# Patient Record
Sex: FEMALE | Race: WHITE | NOT HISPANIC OR LATINO | Employment: UNEMPLOYED | URBAN - METROPOLITAN AREA
[De-identification: names, ages, dates, MRNs, and addresses within clinical notes are randomized per-mention and may not be internally consistent; named-entity substitution may affect disease eponyms.]

---

## 2017-07-10 ENCOUNTER — APPOINTMENT (OUTPATIENT)
Dept: GENERAL RADIOLOGY | Facility: HOSPITAL | Age: 37
End: 2017-07-10

## 2017-07-10 ENCOUNTER — HOSPITAL ENCOUNTER (EMERGENCY)
Facility: HOSPITAL | Age: 37
Discharge: HOME | End: 2017-07-10
Attending: EMERGENCY MEDICINE | Admitting: EMERGENCY MEDICINE

## 2017-07-10 VITALS
RESPIRATION RATE: 14 BRPM | HEIGHT: 67 IN | DIASTOLIC BLOOD PRESSURE: 84 MMHG | WEIGHT: 165 LBS | BODY MASS INDEX: 25.9 KG/M2 | TEMPERATURE: 98 F | HEART RATE: 105 BPM | OXYGEN SATURATION: 98 % | SYSTOLIC BLOOD PRESSURE: 127 MMHG

## 2017-07-10 DIAGNOSIS — R00.2 PALPITATIONS: Primary | ICD-10-CM

## 2017-07-10 LAB
ALBUMIN SERPL-MCNC: 4.8 G/DL (ref 3.5–5.2)
ALBUMIN/GLOB SERPL: 1.7 G/DL
ALP SERPL-CCNC: 66 U/L (ref 39–117)
ALT SERPL W P-5'-P-CCNC: 79 U/L (ref 1–33)
ANION GAP SERPL CALCULATED.3IONS-SCNC: 26.3 MMOL/L
AST SERPL-CCNC: 79 U/L (ref 1–32)
BASOPHILS # BLD AUTO: 0.04 10*3/MM3 (ref 0–0.2)
BASOPHILS NFR BLD AUTO: 0.5 % (ref 0–1.5)
BILIRUB SERPL-MCNC: 0.6 MG/DL (ref 0.1–1.2)
BUN BLD-MCNC: 11 MG/DL (ref 6–20)
BUN/CREAT SERPL: 13.6 (ref 7–25)
CALCIUM SPEC-SCNC: 9.8 MG/DL (ref 8.6–10.5)
CHLORIDE SERPL-SCNC: 93 MMOL/L (ref 98–107)
CO2 SERPL-SCNC: 17.7 MMOL/L (ref 22–29)
CREAT BLD-MCNC: 0.81 MG/DL (ref 0.57–1)
D DIMER PPP FEU-MCNC: 0.34 MCGFEU/ML (ref 0–0.49)
DEPRECATED RDW RBC AUTO: 46 FL (ref 37–54)
EOSINOPHIL # BLD AUTO: 0.01 10*3/MM3 (ref 0–0.7)
EOSINOPHIL NFR BLD AUTO: 0.1 % (ref 0.3–6.2)
ERYTHROCYTE [DISTWIDTH] IN BLOOD BY AUTOMATED COUNT: 14.2 % (ref 11.7–13)
GFR SERPL CREATININE-BSD FRML MDRD: 80 ML/MIN/1.73
GLOBULIN UR ELPH-MCNC: 2.9 GM/DL
GLUCOSE BLD-MCNC: 68 MG/DL (ref 65–99)
HCG SERPL QL: NEGATIVE
HCT VFR BLD AUTO: 37.8 % (ref 35.6–45.5)
HGB BLD-MCNC: 13.4 G/DL (ref 11.9–15.5)
HOLD SPECIMEN: NORMAL
HOLD SPECIMEN: NORMAL
IMM GRANULOCYTES # BLD: 0.02 10*3/MM3 (ref 0–0.03)
IMM GRANULOCYTES NFR BLD: 0.2 % (ref 0–0.5)
LYMPHOCYTES # BLD AUTO: 1.31 10*3/MM3 (ref 0.9–4.8)
LYMPHOCYTES NFR BLD AUTO: 16.1 % (ref 19.6–45.3)
MCH RBC QN AUTO: 31.5 PG (ref 26.9–32)
MCHC RBC AUTO-ENTMCNC: 35.4 G/DL (ref 32.4–36.3)
MCV RBC AUTO: 88.9 FL (ref 80.5–98.2)
MONOCYTES # BLD AUTO: 0.45 10*3/MM3 (ref 0.2–1.2)
MONOCYTES NFR BLD AUTO: 5.5 % (ref 5–12)
NEUTROPHILS # BLD AUTO: 6.29 10*3/MM3 (ref 1.9–8.1)
NEUTROPHILS NFR BLD AUTO: 77.6 % (ref 42.7–76)
PLATELET # BLD AUTO: 250 10*3/MM3 (ref 140–500)
PMV BLD AUTO: 9.2 FL (ref 6–12)
POTASSIUM BLD-SCNC: 4.4 MMOL/L (ref 3.5–5.2)
PROT SERPL-MCNC: 7.7 G/DL (ref 6–8.5)
RBC # BLD AUTO: 4.25 10*6/MM3 (ref 3.9–5.2)
SODIUM BLD-SCNC: 137 MMOL/L (ref 136–145)
TROPONIN T SERPL-MCNC: <0.01 NG/ML (ref 0–0.03)
TSH SERPL DL<=0.05 MIU/L-ACNC: 2.2 MIU/ML (ref 0.27–4.2)
WBC NRBC COR # BLD: 8.12 10*3/MM3 (ref 4.5–10.7)
WHOLE BLOOD HOLD SPECIMEN: NORMAL
WHOLE BLOOD HOLD SPECIMEN: NORMAL

## 2017-07-10 PROCEDURE — 96360 HYDRATION IV INFUSION INIT: CPT

## 2017-07-10 PROCEDURE — 80053 COMPREHEN METABOLIC PANEL: CPT | Performed by: EMERGENCY MEDICINE

## 2017-07-10 PROCEDURE — 99283 EMERGENCY DEPT VISIT LOW MDM: CPT

## 2017-07-10 PROCEDURE — 85025 COMPLETE CBC W/AUTO DIFF WBC: CPT | Performed by: EMERGENCY MEDICINE

## 2017-07-10 PROCEDURE — 85379 FIBRIN DEGRADATION QUANT: CPT | Performed by: PHYSICIAN ASSISTANT

## 2017-07-10 PROCEDURE — 93010 ELECTROCARDIOGRAM REPORT: CPT | Performed by: INTERNAL MEDICINE

## 2017-07-10 PROCEDURE — 93005 ELECTROCARDIOGRAM TRACING: CPT | Performed by: EMERGENCY MEDICINE

## 2017-07-10 PROCEDURE — 71020 HC CHEST PA AND LATERAL: CPT

## 2017-07-10 PROCEDURE — 84443 ASSAY THYROID STIM HORMONE: CPT | Performed by: PHYSICIAN ASSISTANT

## 2017-07-10 PROCEDURE — 84484 ASSAY OF TROPONIN QUANT: CPT | Performed by: EMERGENCY MEDICINE

## 2017-07-10 PROCEDURE — 84703 CHORIONIC GONADOTROPIN ASSAY: CPT | Performed by: EMERGENCY MEDICINE

## 2017-07-10 RX ORDER — ASPIRIN 325 MG
325 TABLET ORAL ONCE
Status: DISCONTINUED | OUTPATIENT
Start: 2017-07-10 | End: 2017-07-10 | Stop reason: HOSPADM

## 2017-07-10 RX ORDER — SODIUM CHLORIDE 0.9 % (FLUSH) 0.9 %
10 SYRINGE (ML) INJECTION AS NEEDED
Status: DISCONTINUED | OUTPATIENT
Start: 2017-07-10 | End: 2017-07-10 | Stop reason: HOSPADM

## 2017-07-10 RX ADMIN — SODIUM CHLORIDE 1000 ML: 9 INJECTION, SOLUTION INTRAVENOUS at 14:14

## 2017-07-10 NOTE — DISCHARGE INSTRUCTIONS
Avoid caffeine containing drinks    Slow down alcohol drinking    Return for any worsening symptoms

## 2017-07-10 NOTE — ED PROVIDER NOTES
EMERGENCY DEPARTMENT ENCOUNTER    CHIEF COMPLAINT  Chief Complaint: palpitations  History given by: patient  History limited by: nothing  Room Number: 02/02  PMD: Kalpesh Fair MD      HPI:  Pt is a 36 y.o. female who presents complaining of palpitations onset PTA. Pt states she was sitting down when she began to feel the palpitations which caused her to panic and experience diaphoresis. Pt denies having N/V, CP, SOA, vaginal bleeding, weakness, or dizziness. Pt denies having caffiene today. Pt states she has hx of anxiety which she took medication for but has stopped.  Pt did have a half a can of a [employer] Drink today.  Pt does drink alcohol regularly.      Duration:  PTA  Onset: sudden  Timing: constant  Quality: unspecified  Intensity/Severity: moderate  Progression: improving  Associated Symptoms: diaphoresis, panic  Aggravating Factors: anxiety  Alleviating Factors: unknown  Previous Episodes: Pt has hx of anxiety and has come to the ED previously for similar episodes.   Treatment before arrival: none.    PAST MEDICAL HISTORY  Active Ambulatory Problems     Diagnosis Date Noted   • No Active Ambulatory Problems     Resolved Ambulatory Problems     Diagnosis Date Noted   • No Resolved Ambulatory Problems     No Additional Past Medical History       PAST SURGICAL HISTORY  History reviewed. No pertinent surgical history.    FAMILY HISTORY  History reviewed. No pertinent family history.    SOCIAL HISTORY  Social History     Social History   • Marital status: Single     Spouse name: N/A   • Number of children: N/A   • Years of education: N/A     Occupational History   • Not on file.     Social History Main Topics   • Smoking status: Current Every Day Smoker   • Smokeless tobacco: Not on file   • Alcohol use No   • Drug use: No   • Sexual activity: Not on file     Other Topics Concern   • Not on file     Social History Narrative   • No narrative on file       ALLERGIES  Penicillins    REVIEW OF  SYSTEMS  Review of Systems   Constitutional: Positive for diaphoresis. Negative for fever.   HENT: Negative for sore throat.    Eyes: Negative.    Respiratory: Negative for cough and shortness of breath.    Cardiovascular: Negative for chest pain.   Gastrointestinal: Negative for abdominal pain, diarrhea, nausea and vomiting.   Genitourinary: Negative for dysuria and vaginal bleeding.   Musculoskeletal: Negative for neck pain.   Skin: Negative for rash.   Allergic/Immunologic: Negative.    Neurological: Negative for dizziness, weakness, numbness and headaches.   Hematological: Negative.    Psychiatric/Behavioral: The patient is nervous/anxious.    All other systems reviewed and are negative.      PHYSICAL EXAM  ED Triage Vitals   Temp Heart Rate Resp BP SpO2   07/10/17 1203 07/10/17 1203 07/10/17 1203 07/10/17 1203 07/10/17 1203   98 °F (36.7 °C) 106 16 140/81 100 %      Temp src Heart Rate Source Patient Position BP Location FiO2 (%)   -- -- -- -- --              Physical Exam   Constitutional: She is oriented to person, place, and time and well-developed, well-nourished, and in no distress. No distress.   HENT:   Head: Normocephalic and atraumatic.   Eyes: EOM are normal. Pupils are equal, round, and reactive to light.   Neck: Normal range of motion. Neck supple.   Cardiovascular: Regular rhythm and normal heart sounds.  Tachycardia present.    Pulmonary/Chest: Effort normal and breath sounds normal. No respiratory distress.   Abdominal: Soft. There is no tenderness. There is no rebound and no guarding.   Musculoskeletal: Normal range of motion. She exhibits no edema.   Neurological: She is alert and oriented to person, place, and time. She has normal sensation and normal strength.   Skin: Skin is warm and dry. No rash noted.   Psychiatric: Mood and affect normal.   Nursing note and vitals reviewed.      LAB RESULTS  Lab Results (last 24 hours)     Procedure Component Value Units Date/Time    CBC & Differential  [[id number]] Collected:  07/10/17 1236    Specimen:  Blood Updated:  07/10/17 1251    Narrative:       The following orders were created for panel order CBC & Differential.  Procedure                               Abnormality         Status                     ---------                               -----------         ------                     CBC Auto Differential[[id number]]         Abnormal            Final result                 Please view results for these tests on the individual orders.    Comprehensive Metabolic Panel [[id number]]  (Abnormal) Collected:  07/10/17 1236    Specimen:  Blood Updated:  07/10/17 1309     Glucose 68 mg/dL      BUN 11 mg/dL      Creatinine 0.81 mg/dL      Sodium 137 mmol/L      Potassium 4.4 mmol/L      Chloride 93 (L) mmol/L      CO2 17.7 (L) mmol/L      Calcium 9.8 mg/dL      Total Protein 7.7 g/dL      Albumin 4.80 g/dL      ALT (SGPT) 79 (H) U/L      AST (SGOT) 79 (H) U/L      Alkaline Phosphatase 66 U/L      Total Bilirubin 0.6 mg/dL      eGFR Non African Amer 80 mL/min/1.73      Globulin 2.9 gm/dL      A/G Ratio 1.7 g/dL      BUN/Creatinine Ratio 13.6     Anion Gap 26.3 mmol/L     Troponin [[id number]]  (Normal) Collected:  07/10/17 1236    Specimen:  Blood Updated:  07/10/17 1315     Troponin T <0.010 ng/mL     Narrative:       Troponin T Reference Ranges:  Less than 0.03 ng/mL:    Negative for AMI  0.03 to 0.09 ng/mL:      Indeterminant for AMI  Greater than 0.09 ng/mL: Positive for AMI    hCG, Serum, Qualitative [[id number]]  (Normal) Collected:  07/10/17 1236    Specimen:  Blood Updated:  07/10/17 1308     HCG Qualitative Negative    CBC Auto Differential [[id number]]  (Abnormal) Collected:  07/10/17 1236    Specimen:  Blood Updated:  07/10/17 1251     WBC 8.12 10*3/mm3      RBC 4.25 10*6/mm3      Hemoglobin 13.4 g/dL      Hematocrit 37.8 %      MCV 88.9 fL      MCH 31.5 pg      MCHC 35.4 g/dL      RDW 14.2 (H) %      RDW-SD 46.0 fl      MPV 9.2 fL      Platelets 250 10*3/mm3       Neutrophil % 77.6 (H) %      Lymphocyte % 16.1 (L) %      Monocyte % 5.5 %      Eosinophil % 0.1 (L) %      Basophil % 0.5 %      Immature Grans % 0.2 %      Neutrophils, Absolute 6.29 10*3/mm3      Lymphocytes, Absolute 1.31 10*3/mm3      Monocytes, Absolute 0.45 10*3/mm3      Eosinophils, Absolute 0.01 10*3/mm3      Basophils, Absolute 0.04 10*3/mm3      Immature Grans, Absolute 0.02 10*3/mm3     TSH [[id number]]  (Normal) Collected:  07/10/17 1236    Specimen:  Blood Updated:  07/10/17 1315     TSH 2.200 mIU/mL     D-dimer, Quantitative [[id number]]  (Normal) Collected:  07/10/17 1236    Specimen:  Blood Updated:  07/10/17 1302     D-Dimer, Quantitative 0.34 MCGFEU/mL     Narrative:       The Stago D-Dimer test used in conjunction with a clinical pretest probability (PTP) assessment model, has been approved by the FDA to rule out the presence of venous thromboembolism (VTE) in outpatients suspected of deep venous thrombosis (DVT) or pulmonary embolism (PE).           I ordered the above labs and reviewed the results    RADIOLOGY  XR Chest 2 View   Final Result       Negative for any acute abnormality.      I ordered the above noted radiological studies. Interpreted by radiologist. Reviewed by me in PACS.       PROCEDURES  Procedures      PROGRESS AND CONSULTS  ED Course   1220- Ordered EKG, labs, amd CXR for further evaluation. Ordered asprin for pain and IVF for hydration.   1355- Ordered IVF for hydration.  1357- Per Dr. Jeffrey, pt was drinking heavily this weekend and maybe experiencing withdrawal sx  1400- BP- 140/81 HR- 106 Temp- 98 °F (36.7 °C) O2 sat- 100%  Rechecked the patient who is in NAD and is resting comfortably. Notified pt of lab and imaging results including negative Chest CT. Informed pt to cut back on caffeine and alcohol intake. Discussed w/ pt and family plan to discharge. Pt and family are in agreement and all questions and concerns have been addressed at this time.     MEDICAL DECISION  MAKING  Results were reviewed/discussed with the patient and they were also made aware of online access. Pt also made aware that some labs, such as cultures, will not be resulted during ER visit and follow up with PMD is necessary.     MDM  Number of Diagnoses or Management Options  Palpitations:   Diagnosis management comments: No evidence of PE or cardiac ischemia.         Amount and/or Complexity of Data Reviewed  Decide to obtain previous medical records or to obtain history from someone other than the patient: yes  Review and summarize past medical records: yes (Pt was seen in the ED 1/22/16 for palpitations. )  Independent visualization of images, tracings, or specimens: yes           DIAGNOSIS  Final diagnoses:   Palpitations       DISPOSITION  DISCHARGE    Patient discharged in stable condition.    Reviewed implications of results, diagnosis, meds, responsibility to follow up, warning signs and symptoms of possible worsening, potential complications and reasons to return to ER.    Patient/Family voiced understanding of above instructions.    Discussed plan for discharge, as there is no emergent indication for admission.  Pt/family is agreeable and understands need for follow up and repeat testing.  Pt is aware that discharge does not mean that nothing is wrong but it indicates no emergency is present that requires admission and they must continue care with follow-up as given below or physician of their choice.     FOLLOW-UP  Kalpesh Fair MD  [address] [address]  [phone]    In 3 days  for recheck of symptoms         Medication List      Notice     No changes were made to your prescriptions during this visit.            Latest Documented Vital Signs:  As of 2:47 PM  BP- 127/84 HR- 105 Temp- 98 °F (36.7 °C) O2 sat- 98%    --  Documentation assistance provided by devan Gunn for Randy Nsah PA-C.  Information recorded by the scribmaxim was done at my direction and has been  verified and validated by me.     Mack Gunn  07/10/17 1453       SHELTON Villegas  07/10/17 1073

## 2017-07-10 NOTE — ED PROVIDER NOTES
"I supervised care provided by the midlevel provider.    We have discussed this patient's history, physical exam, and treatment plan.   I have reviewed the note and personally saw and examined the patient and agree with the plan of care.      Pt presents to the ED c/o palpitations described as \"rapid\" onset earlier today while pt was sitting at work. Pt reports that she drinks energy drinks routinely and last drank one earlier today. Pt denies dyspnea, CP, abd pain, and N/V/D. Pt reports that she had a similar episodes of palpitations last year after drinking excessive amounts of caffeinated beverages; pt reports that her sx resolved after pt stopped drinking the caffeinated beverages. Pt reports that drinks at last 0.5 pint ETOH daily. On physical exam, pt is tachycardic. Lungs are CTAB. Pt appears mildly anxious.    Will hold caffeine.  Given fluids for mild AKA.   Follow up with Dr. Fair.      EKG:           EKG time: 12:35 PM  Rhythm/Rate: Sinus tachycardia rate 155  P waves and WI: Normal P waves  QRS, axis: Normal QRS   ST and T waves: Normal ST     Interpreted Contemporaneously by me, independently viewed  Unchanged compared to prior 01/22/16          PROGRESS NOTES:    1:36 PM:  Discussed case with Dr. Fair, PMD.               Documentation assistance provided by Neal Kennedy. Information recorded by the scribe was done at my direction and has been verified and validated by me.     Entered by Neal Kennedy, acting as scribe for Dr. Rachele MD.        Neal Kennedy  07/10/17 8381       Nathan Jeffrey MD  07/10/17 3890    "

## 2017-07-10 NOTE — ED NOTES
Heart palpitations that began while sitting at work. EMS report initial pulse rate of 140. Pt reports feeling better now, pulse rate 106.      Sumaya Ward RN  07/10/17 120

## 2017-07-11 ENCOUNTER — OFFICE VISIT (OUTPATIENT)
Dept: INTERNAL MEDICINE | Facility: CLINIC | Age: 37
End: 2017-07-11

## 2017-07-11 ENCOUNTER — TELEPHONE (OUTPATIENT)
Dept: SOCIAL WORK | Facility: HOSPITAL | Age: 37
End: 2017-07-11

## 2017-07-11 VITALS
TEMPERATURE: 98.6 F | HEIGHT: 67 IN | SYSTOLIC BLOOD PRESSURE: 126 MMHG | BODY MASS INDEX: 24.67 KG/M2 | RESPIRATION RATE: 16 BRPM | OXYGEN SATURATION: 96 % | WEIGHT: 157.2 LBS | HEART RATE: 94 BPM | DIASTOLIC BLOOD PRESSURE: 72 MMHG

## 2017-07-11 DIAGNOSIS — F10.10 ALCOHOL ABUSE: ICD-10-CM

## 2017-07-11 DIAGNOSIS — Z72.0 TOBACCO ABUSE: Primary | ICD-10-CM

## 2017-07-11 PROCEDURE — 99213 OFFICE O/P EST LOW 20 MIN: CPT | Performed by: INTERNAL MEDICINE

## 2017-07-11 RX ORDER — LORATADINE 10 MG/1
10 TABLET ORAL DAILY
COMMUNITY

## 2017-07-11 NOTE — PROGRESS NOTES
"Swathi Kyle is a 36 y.o. female.     Chief Complaint   Patient presents with   • Rapid Heart Rate     \"150\" YESTERDAY, BETTER TODAY         History of Present Illness     The following portions of the patient's history were reviewed and updated as appropriate: allergies, current medications, past social history and problem list.    Outpatient Prescriptions Marked as Taking for the 7/11/17 encounter (Office Visit) with Kalpesh Fair MD   Medication Sig Dispense Refill   • loratadine (ALAVERT) 10 MG tablet Take 10 mg by mouth Daily.         Review of Systems    Objective   Vitals:    07/11/17 1406   BP: 126/72   Pulse: 94   Resp: 16   Temp: 98.6 °F (37 °C)   SpO2: 96%      Last Weight    07/11/17  1406   Weight: 157 lb 3.2 oz (71.3 kg)    [unfilled]  Body mass index is 24.62 kg/(m^2).      Physical Exam      Problem List Items Addressed This Visit     None        Assessment/Plan            Aurelio disclaimer:   Much of this encounter note is an electronic transcription/translation of spoken language to printed text. The electronic translation of spoken language may permit erroneous, or at times, nonsensical words or phrases to be inadvertently transcribed; Although I have reviewed the note for such errors, some may still exist.   "

## 2017-07-11 NOTE — PROGRESS NOTES
"Swathi Kyle is a 36 y.o. female.     Chief Complaint   Patient presents with   • Rapid Heart Rate     \"150\" YESTERDAY, BETTER TODAY         HPI Comments: Was in the emergency room yesterday with palpitations and a panic attack.  Sinus tachycardia about 120.  She had a moderate anion gap acidosis.  Actually began working with to the emergency room by ambulance.  Things calm down in the emergency room.  I discussed the situation with the emergency room physician.       The following portions of the patient's history were reviewed and updated as appropriate: allergies, current medications, past social history and problem list.    Outpatient Prescriptions Marked as Taking for the 7/11/17 encounter (Office Visit) with Kalpesh Fair MD   Medication Sig Dispense Refill   • loratadine (ALAVERT) 10 MG tablet Take 10 mg by mouth Daily.         Review of Systems   Constitutional: Negative for chills and fever.   Respiratory: Positive for shortness of breath. Negative for wheezing.    Cardiovascular: Positive for palpitations. Negative for chest pain and leg swelling.   Gastrointestinal: Negative for abdominal distention, abdominal pain and diarrhea.       Objective   Vitals:    07/11/17 1406   BP: 126/72   Pulse: 94   Resp: 16   Temp: 98.6 °F (37 °C)   SpO2: 96%      Last Weight    07/11/17  1406   Weight: 157 lb 3.2 oz (71.3 kg)    [unfilled]  Body mass index is 24.62 kg/(m^2).      Physical Exam   Constitutional: She appears well-developed and well-nourished.   Cardiovascular: Normal rate, regular rhythm, normal heart sounds and intact distal pulses.  Exam reveals no friction rub.    No murmur heard.  Pulmonary/Chest: Effort normal and breath sounds normal. No respiratory distress. She has no wheezes. She has no rales.         Problem List Items Addressed This Visit        Other    Alcohol abuse      Other Visit Diagnoses     Tobacco abuse    -  Primary    Relevant Medications    varenicline (CHANTIX " STARTING MONTH JOSE) 0.5 MG X 11 & 1 MG X 42 tablet        Assessment/Plan   In the emergency room yesterday with a panic attack.  She's been pretty much overstimulated between tobacco and caffeine and Sudafed.  Also drinking too much alcohol.  Significant anion gap acidosis.  She's much better today.  We discussed the tobacco use and she is wishing to start on Chantix today.  We discussed something for anxiety but will hold off until she is well situated on the Chantix.  Likely start Zoloft at a later date.  We discussed staying away from pseudoephedrine.  Complete alcohol abstinence.  Avoid energy drinks and Sudafed.  I'm going to recheck her in a month and see how she is doing and hopefully start on some Zoloft at that time.  Mother did very well in the past on Chantix which is a good point in favor of Chantix being a good choice.           Dragon disclaimer:   Much of this encounter note is an electronic transcription/translation of spoken language to printed text. The electronic translation of spoken language may permit erroneous, or at times, nonsensical words or phrases to be inadvertently transcribed; Although I have reviewed the note for such errors, some may still exist.

## 2017-08-07 RX ORDER — VARENICLINE TARTRATE 1 MG/1
1 TABLET, FILM COATED ORAL 2 TIMES DAILY
Qty: 60 TABLET | Refills: 0 | Status: SHIPPED | OUTPATIENT
Start: 2017-08-07 | End: 2017-08-08 | Stop reason: SDUPTHER

## 2017-08-08 RX ORDER — VARENICLINE TARTRATE 1 MG/1
1 TABLET, FILM COATED ORAL 2 TIMES DAILY
Qty: 60 TABLET | Refills: 0 | Status: SHIPPED | OUTPATIENT
Start: 2017-08-08 | End: 2017-09-05 | Stop reason: SDUPTHER

## 2017-08-14 ENCOUNTER — OFFICE VISIT (OUTPATIENT)
Dept: INTERNAL MEDICINE | Facility: CLINIC | Age: 37
End: 2017-08-14

## 2017-08-14 VITALS
SYSTOLIC BLOOD PRESSURE: 132 MMHG | BODY MASS INDEX: 27.06 KG/M2 | RESPIRATION RATE: 16 BRPM | HEART RATE: 87 BPM | WEIGHT: 172.4 LBS | OXYGEN SATURATION: 98 % | TEMPERATURE: 98.5 F | HEIGHT: 67 IN | DIASTOLIC BLOOD PRESSURE: 76 MMHG

## 2017-08-14 DIAGNOSIS — F41.9 ANXIETY: Primary | ICD-10-CM

## 2017-08-14 PROCEDURE — 99213 OFFICE O/P EST LOW 20 MIN: CPT | Performed by: INTERNAL MEDICINE

## 2017-08-14 NOTE — PROGRESS NOTES
Subjective   Emma Kyle is a 37 y.o. female.     Chief Complaint   Patient presents with   • Anxiety         Anxiety   Presents for follow-up visit. Symptoms include nervous/anxious behavior. Patient reports no chest pain, depressed mood, nausea, palpitations, panic or shortness of breath. Symptoms occur occasionally. The severity of symptoms is mild. The quality of sleep is good. Nighttime awakenings: none.            The following portions of the patient's history were reviewed and updated as appropriate: allergies, current medications, past social history and problem list.    Outpatient Prescriptions Marked as Taking for the 8/14/17 encounter (Office Visit) with Kalpesh Fair MD   Medication Sig Dispense Refill   • loratadine (ALAVERT) 10 MG tablet Take 10 mg by mouth Daily.     • varenicline (CHANTIX CONTINUING MONTH JOSE) 1 MG tablet Take 1 tablet by mouth 2 (Two) Times a Day. 60 tablet 0       Review of Systems   Constitutional: Negative for chills and fever.   Respiratory: Negative for shortness of breath.    Cardiovascular: Negative for chest pain and palpitations.   Gastrointestinal: Negative for constipation, diarrhea, nausea and vomiting.   Psychiatric/Behavioral: The patient is nervous/anxious.        Objective   Vitals:    08/14/17 1505   BP: 132/76   Pulse: 87   Resp: 16   Temp: 98.5 °F (36.9 °C)   SpO2: 98%      Last Weight    08/14/17  1505   Weight: 172 lb 6.4 oz (78.2 kg)    [unfilled]  Body mass index is 27 kg/(m^2).      Physical Exam   Constitutional: She appears well-developed and well-nourished.   Psychiatric: She has a normal mood and affect. Her behavior is normal. Judgment and thought content normal.         Problem List Items Addressed This Visit     None      Visit Diagnoses     Anxiety    -  Primary        Assessment/Plan   She is in for recheck on anxiety.  She's on Chantix for tobacco cessation.  She is down to about 2 cigarettes per day.  Had been 1 pack per day.  She's held  off on taking Zoloft would not take it at the same time as his Chantix.  Has not seemed to of needed it recently.  She is working very hard with long hours and that tires her out pretty good.  She rarely drinks alcohol now.  Advised to try to keep that complete abstinence.  I'll check again in 3 months since he'll return with the Chantix and see if it's time to try her on some Zoloft.  He is also off of caffeine and energy drinks.  Spent 15 this with patient today, over 50% counseling her on her anxiety and substances.  She is also due for TD AP and Pneumovax today.  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         Dragon disclaimer:   Much of this encounter note is an electronic transcription/translation of spoken language to printed text. The electronic translation of spoken language may permit erroneous, or at times, nonsensical words or phrases to be inadvertently transcribed; Although I have reviewed the note for such errors, some may still exist.

## 2017-09-05 RX ORDER — VARENICLINE TARTRATE 1 MG/1
1 TABLET, FILM COATED ORAL 2 TIMES DAILY
Qty: 60 TABLET | Refills: 0 | Status: SHIPPED | OUTPATIENT
Start: 2017-09-05 | End: 2017-10-31

## 2017-10-31 ENCOUNTER — OFFICE VISIT (OUTPATIENT)
Dept: INTERNAL MEDICINE | Facility: CLINIC | Age: 37
End: 2017-10-31

## 2017-10-31 VITALS
HEIGHT: 67 IN | DIASTOLIC BLOOD PRESSURE: 62 MMHG | OXYGEN SATURATION: 94 % | RESPIRATION RATE: 16 BRPM | HEART RATE: 100 BPM | WEIGHT: 182 LBS | BODY MASS INDEX: 28.56 KG/M2 | TEMPERATURE: 98.9 F | SYSTOLIC BLOOD PRESSURE: 130 MMHG

## 2017-10-31 DIAGNOSIS — F41.9 ANXIETY: ICD-10-CM

## 2017-10-31 DIAGNOSIS — M25.572 ACUTE LEFT ANKLE PAIN: Primary | ICD-10-CM

## 2017-10-31 PROCEDURE — 99213 OFFICE O/P EST LOW 20 MIN: CPT | Performed by: INTERNAL MEDICINE

## 2017-10-31 RX ORDER — PROPRANOLOL HYDROCHLORIDE 40 MG/1
40 TABLET ORAL 3 TIMES DAILY
Qty: 30 TABLET | Refills: 2 | Status: SHIPPED | OUTPATIENT
Start: 2017-10-31 | End: 2018-02-22 | Stop reason: SDUPTHER

## 2017-10-31 NOTE — PROGRESS NOTES
Subjective   Emma Kyle is a 37 y.o. female.     Chief Complaint   Patient presents with   • Anxiety         Anxiety   Presents for follow-up visit. Symptoms include nervous/anxious behavior. Patient reports no chest pain, depressed mood, nausea, palpitations, panic or shortness of breath. Symptoms occur occasionally. The severity of symptoms is mild. The quality of sleep is good. Nighttime awakenings: none.            The following portions of the patient's history were reviewed and updated as appropriate: allergies, current medications, past social history and problem list.    Outpatient Prescriptions Marked as Taking for the 10/31/17 encounter (Office Visit) with Kalpesh Fair MD   Medication Sig Dispense Refill   • loratadine (ALAVERT) 10 MG tablet Take 10 mg by mouth Daily.         Review of Systems   Constitutional: Negative for chills and fever.   Respiratory: Negative for shortness of breath.    Cardiovascular: Negative for chest pain and palpitations.   Gastrointestinal: Negative for constipation, diarrhea, nausea and vomiting.   Musculoskeletal: Positive for arthralgias (left ankle).   Psychiatric/Behavioral: The patient is nervous/anxious.        Objective   Vitals:    10/31/17 1525   BP: 130/62   Pulse: 100   Resp: 16   Temp: 98.9 °F (37.2 °C)   SpO2: 94%      Last Weight    10/31/17  1525   Weight: 182 lb (82.6 kg)    [unfilled]  Body mass index is 28.51 kg/(m^2).      Physical Exam   Constitutional: She appears well-developed and well-nourished.   Psychiatric: She has a normal mood and affect. Her behavior is normal. Judgment and thought content normal.         Problem List Items Addressed This Visit     None      Visit Diagnoses     Acute left ankle pain    -  Primary    Anxiety            Assessment/Plan   She is in for recheck on anxiety.  She's been off of tobacco for 2 months and off of Chantix for same.  At time.  Doing very well with her tobacco cessation.   She is also having pain in  left ankle.  Symptoms for 6-12 months.  His been particularly intense in the last 2 weeks.  Wearing a brace.  No specific injury.  This is along the inferior groove at the medial malleolus.  Pain is only with weightbearing.  With torsion or palpation.  We'll need to defer this to foot orthopedist.  She has been taking 1 Aleve a day.  Needs to take 2 twice a day.  She still has periodic and intermittent anxiety.  Generally at work.  We'll try on some Inderal when necessary.  Spent 15 this with patient today, over 50% counseling her on her anxiety and substances.  Recheck anxiety in 4 months.         Dragon disclaimer:   Much of this encounter note is an electronic transcription/translation of spoken language to printed text. The electronic translation of spoken language may permit erroneous, or at times, nonsensical words or phrases to be inadvertently transcribed; Although I have reviewed the note for such errors, some may still exist.

## 2018-02-22 ENCOUNTER — OFFICE VISIT (OUTPATIENT)
Dept: INTERNAL MEDICINE | Facility: CLINIC | Age: 38
End: 2018-02-22

## 2018-02-22 VITALS
SYSTOLIC BLOOD PRESSURE: 132 MMHG | OXYGEN SATURATION: 98 % | HEART RATE: 89 BPM | WEIGHT: 193.2 LBS | BODY MASS INDEX: 30.32 KG/M2 | TEMPERATURE: 98.3 F | DIASTOLIC BLOOD PRESSURE: 82 MMHG | RESPIRATION RATE: 16 BRPM | HEIGHT: 67 IN

## 2018-02-22 DIAGNOSIS — F41.9 ANXIETY: Primary | ICD-10-CM

## 2018-02-22 PROCEDURE — 99213 OFFICE O/P EST LOW 20 MIN: CPT | Performed by: INTERNAL MEDICINE

## 2018-02-22 RX ORDER — PROPRANOLOL HYDROCHLORIDE 40 MG/1
40 TABLET ORAL 3 TIMES DAILY PRN
Qty: 30 TABLET | Refills: 2 | Status: SHIPPED | OUTPATIENT
Start: 2018-02-22 | End: 2018-10-19 | Stop reason: SDUPTHER

## 2018-02-22 NOTE — PROGRESS NOTES
Subjective   Emma Kyle is a 37 y.o. female.     Chief Complaint   Patient presents with   • Anxiety         Anxiety   Presents for follow-up visit. Symptoms include nervous/anxious behavior. Patient reports no chest pain, depressed mood, nausea, palpitations, panic or shortness of breath. Symptoms occur occasionally. The severity of symptoms is mild. The quality of sleep is good. Nighttime awakenings: none.            The following portions of the patient's history were reviewed and updated as appropriate: allergies, current medications, past social history and problem list.    Outpatient Prescriptions Marked as Taking for the 2/22/18 encounter (Office Visit) with Kalpesh Fair MD   Medication Sig Dispense Refill   • propranolol (INDERAL) 40 MG tablet Take 1 tablet by mouth 3 (Three) Times a Day. 30 tablet 2       Review of Systems   Constitutional: Negative for chills and fever.   Respiratory: Negative for shortness of breath.    Cardiovascular: Negative for chest pain and palpitations.   Gastrointestinal: Negative for constipation, diarrhea, nausea and vomiting.   Musculoskeletal: Positive for arthralgias (left ankle).   Psychiatric/Behavioral: The patient is nervous/anxious.        Objective   Vitals:    02/22/18 1434   BP: 132/82   Pulse: 89   Resp: 16   Temp: 98.3 °F (36.8 °C)   SpO2: 98%      Last Weight    02/22/18  1434   Weight: 87.6 kg (193 lb 3.2 oz)    [unfilled]  Body mass index is 30.25 kg/(m^2).      Physical Exam   Constitutional: She appears well-developed and well-nourished.   Psychiatric: She has a normal mood and affect. Her behavior is normal. Judgment and thought content normal.         Problem List Items Addressed This Visit        Other    Anxiety - Primary        Assessment/Plan   She is in for recheck on anxiety.  She's been off of tobacco for 5 months and off of Chantix.  Doing very well with her tobacco cessation.   She is also having pain in left ankle.  Minimal now. She still  has periodic and intermittent anxiety.  Generally at work.  Inderal when necessary great for her.  She has a rare drink of alcohol when she's out in social settings.  We discussed trying to avoid that.  Spent 15 this with patient today, over 50% counseling her on her anxiety and substances.  Weight is up 50 pounds with tobacco cessation.  She's going to a weight loss program and started phentermine.  Recheck anxiety in 4 months.         Dragon disclaimer:   Much of this encounter note is an electronic transcription/translation of spoken language to printed text. The electronic translation of spoken language may permit erroneous, or at times, nonsensical words or phrases to be inadvertently transcribed; Although I have reviewed the note for such errors, some may still exist.

## 2018-06-15 ENCOUNTER — OFFICE VISIT (OUTPATIENT)
Dept: INTERNAL MEDICINE | Facility: CLINIC | Age: 38
End: 2018-06-15

## 2018-06-15 VITALS
BODY MASS INDEX: 25.49 KG/M2 | WEIGHT: 162.4 LBS | HEART RATE: 117 BPM | TEMPERATURE: 98.9 F | SYSTOLIC BLOOD PRESSURE: 116 MMHG | OXYGEN SATURATION: 97 % | HEIGHT: 67 IN | RESPIRATION RATE: 16 BRPM | DIASTOLIC BLOOD PRESSURE: 86 MMHG

## 2018-06-15 DIAGNOSIS — Z00.00 ENCOUNTER FOR PREVENTIVE HEALTH EXAMINATION: Primary | ICD-10-CM

## 2018-06-15 DIAGNOSIS — F41.9 ANXIETY: ICD-10-CM

## 2018-06-15 PROCEDURE — 99395 PREV VISIT EST AGE 18-39: CPT | Performed by: INTERNAL MEDICINE

## 2018-06-15 RX ORDER — PHENTERMINE HYDROCHLORIDE 37.5 MG/1
37.5 CAPSULE ORAL EVERY MORNING
COMMUNITY
End: 2018-10-19

## 2018-06-15 RX ORDER — TRIAMTERENE AND HYDROCHLOROTHIAZIDE 75; 50 MG/1; MG/1
0.5 TABLET ORAL DAILY
COMMUNITY
End: 2018-10-19 | Stop reason: SDUPTHER

## 2018-06-15 NOTE — PATIENT INSTRUCTIONS
BMI for Adults  Body mass index (BMI) is a number that is calculated from a person's weight and height. In most adults, the number is used to find how much of an adult's weight is made up of fat. BMI is not as accurate as a direct measure of body fat.  How is BMI calculated?  BMI is calculated by dividing weight in kilograms by height in meters squared. It can also be calculated by dividing weight in pounds by height in inches squared, then multiplying the resulting number by 703. Charts are available to help you find your BMI quickly and easily without doing this calculation.  How is BMI interpreted?  Health care professionals use BMI charts to identify whether an adult is underweight, at a normal weight, or overweight based on the following guidelines:  · Underweight: BMI less than 18.5.  · Normal weight: BMI between 18.5 and 24.9.  · Overweight: BMI between 25 and 29.9.  · Obese: BMI of 30 and above.    BMI is usually interpreted the same for males and females.  Weight includes both fat and muscle, so someone with a muscular build, such as an athlete, may have a BMI that is higher than 24.9. In cases like these, BMI may not accurately depict body fat. To determine if excess body fat is the cause of a BMI of 25 or higher, further assessments may need to be done by a health care provider.  Why is BMI a useful tool?  BMI is used to identify a possible weight problem that may be related to a medical problem or may increase the risk for medical problems. BMI can also be used to promote changes to reach a healthy weight.  This information is not intended to replace advice given to you by your health care provider. Make sure you discuss any questions you have with your health care provider.  Document Released: 08/29/2005 Document Revised: 04/27/2017 Document Reviewed: 05/15/2015  [employer] Interactive Patient Education © 2018 [employer]  Exercising to Lose Weight  Exercising can help you to lose weight. In order to  lose weight through exercise, you need to do vigorous-intensity exercise. You can tell that you are exercising with vigorous intensity if you are breathing very hard and fast and cannot hold a conversation while exercising.  Moderate-intensity exercise helps to maintain your current weight. You can tell that you are exercising at a moderate level if you have a higher heart rate and faster breathing, but you are still able to hold a conversation.  How often should I exercise?  Choose an activity that you enjoy and set realistic goals. Your health care provider can help you to make an activity plan that works for you. Exercise regularly as directed by your health care provider. This may include:  · Doing resistance training twice each week, such as:  ? Push-ups.  ? Sit-ups.  ? Lifting weights.  ? Using resistance bands.  · Doing a given intensity of exercise for a given amount of time. Choose from these options:  ? 150 minutes of moderate-intensity exercise every week.  ? 75 minutes of vigorous-intensity exercise every week.  ? A mix of moderate-intensity and vigorous-intensity exercise every week.    Children, pregnant women, people who are out of shape, people who are overweight, and older adults may need to consult a health care provider for individual recommendations. If you have any sort of medical condition, be sure to consult your health care provider before starting a new exercise program.  What are some activities that can help me to lose weight?  · Walking at a rate of at least 4.5 miles an hour.  · Jogging or running at a rate of 5 miles per hour.  · Biking at a rate of at least 10 miles per hour.  · Lap swimming.  · Roller-skating or in-line skating.  · Cross-country skiing.  · Vigorous competitive sports, such as football, basketball, and soccer.  · Jumping rope.  · Aerobic dancing.  How can I be more active in my day-to-day activities?  · Use the stairs instead of the elevator.  · Take a walk during your  lunch break.  · If you drive, park your car farther away from work or school.  · If you take public transportation, get off one stop early and walk the rest of the way.  · Make all of your phone calls while standing up and walking around.  · Get up, stretch, and walk around every 30 minutes throughout the day.  What guidelines should I follow while exercising?  · Do not exercise so much that you hurt yourself, feel dizzy, or get very short of breath.  · Consult your health care provider prior to starting a new exercise program.  · Wear comfortable clothes and shoes with good support.  · Drink plenty of water while you exercise to prevent dehydration or heat stroke. Body water is lost during exercise and must be replaced.  · Work out until you breathe faster and your heart beats faster.  This information is not intended to replace advice given to you by your health care provider. Make sure you discuss any questions you have with your health care provider.  Document Released: 01/20/2012 Document Revised: 05/25/2017 Document Reviewed: 05/21/2015  [employer] Interactive Patient Education © 2018 [employer]  Calorie Counting for Weight Loss  Calories are units of energy. Your body needs a certain amount of calories from food to keep you going throughout the day. When you eat more calories than your body needs, your body stores the extra calories as fat. When you eat fewer calories than your body needs, your body burns fat to get the energy it needs.  Calorie counting means keeping track of how many calories you eat and drink each day. Calorie counting can be helpful if you need to lose weight. If you make sure to eat fewer calories than your body needs, you should lose weight. Ask your health care provider what a healthy weight is for you.  For calorie counting to work, you will need to eat the right number of calories in a day in order to lose a healthy amount of weight per week. A dietitian can help you determine how  many calories you need in a day and will give you suggestions on how to reach your calorie goal.  · A healthy amount of weight to lose per week is usually 1-2 lb (0.5-0.9 kg). This usually means that your daily calorie intake should be reduced by 500-750 calories.  · Eating 1,200 - 1,500 calories per day can help most women lose weight.  · Eating 1,500 - 1,800 calories per day can help most men lose weight.    What is my plan?  My goal is to have __________ calories per day.  If I have this many calories per day, I should lose around __________ pounds per week.  What do I need to know about calorie counting?  In order to meet your daily calorie goal, you will need to:  · Find out how many calories are in each food you would like to eat. Try to do this before you eat.  · Decide how much of the food you plan to eat.  · Write down what you ate and how many calories it had. Doing this is called keeping a food log.    To successfully lose weight, it is important to balance calorie counting with a healthy lifestyle that includes regular activity. Aim for 150 minutes of moderate exercise (such as walking) or 75 minutes of vigorous exercise (such as running) each week.  Where do I find calorie information?    The number of calories in a food can be found on a Nutrition Facts label. If a food does not have a Nutrition Facts label, try to look up the calories online or ask your dietitian for help.  Remember that calories are listed per serving. If you choose to have more than one serving of a food, you will have to multiply the calories per serving by the amount of servings you plan to eat. For example, the label on a package of bread might say that a serving size is 1 slice and that there are 90 calories in a serving. If you eat 1 slice, you will have eaten 90 calories. If you eat 2 slices, you will have eaten 180 calories.  How do I keep a food log?  Immediately after each meal, record the following information in your food  "log:  · What you ate. Don't forget to include toppings, sauces, and other extras on the food.  · How much you ate. This can be measured in cups, ounces, or number of items.  · How many calories each food and drink had.  · The total number of calories in the meal.    Keep your food log near you, such as in a small notebook in your pocket, or use a mobile zeus or website. Some programs will calculate calories for you and show you how many calories you have left for the day to meet your goal.  What are some calorie counting tips?  · Use your calories on foods and drinks that will fill you up and not leave you hungry:  ? Some examples of foods that fill you up are nuts and nut butters, vegetables, lean proteins, and high-fiber foods like whole grains. High-fiber foods are foods with more than 5 g fiber per serving.  ? Drinks such as sodas, specialty coffee drinks, alcohol, and juices have a lot of calories, yet do not fill you up.  · Eat nutritious foods and avoid empty calories. Empty calories are calories you get from foods or beverages that do not have many vitamins or protein, such as candy, sweets, and soda. It is better to have a nutritious high-calorie food (such as an avocado) than a food with few nutrients (such as a bag of chips).  · Know how many calories are in the foods you eat most often. This will help you calculate calorie counts faster.  · Pay attention to calories in drinks. Low-calorie drinks include water and unsweetened drinks.  · Pay attention to nutrition labels for \"low fat\" or \"fat free\" foods. These foods sometimes have the same amount of calories or more calories than the full fat versions. They also often have added sugar, starch, or salt, to make up for flavor that was removed with the fat.  · Find a way of tracking calories that works for you. Get creative. Try different apps or programs if writing down calories does not work for you.  What are some portion control tips?  · Know how many " calories are in a serving. This will help you know how many servings of a certain food you can have.  · Use a measuring cup to measure serving sizes. You could also try weighing out portions on a kitchen scale. With time, you will be able to estimate serving sizes for some foods.  · Take some time to put servings of different foods on your favorite plates, bowls, and cups so you know what a serving looks like.  · Try not to eat straight from a bag or box. Doing this can lead to overeating. Put the amount you would like to eat in a cup or on a plate to make sure you are eating the right portion.  · Use smaller plates, glasses, and bowls to prevent overeating.  · Try not to multitask (for example, watch TV or use your computer) while eating. If it is time to eat, sit down at a table and enjoy your food. This will help you to know when you are full. It will also help you to be aware of what you are eating and how much you are eating.  What are tips for following this plan?  Reading food labels  · Check the calorie count compared to the serving size. The serving size may be smaller than what you are used to eating.  · Check the source of the calories. Make sure the food you are eating is high in vitamins and protein and low in saturated and trans fats.  Shopping  · Read nutrition labels while you shop. This will help you make healthy decisions before you decide to purchase your food.  · Make a grocery list and stick to it.  Cooking  · Try to cook your favorite foods in a healthier way. For example, try baking instead of frying.  · Use low-fat dairy products.  Meal planning  · Use more fruits and vegetables. Half of your plate should be fruits and vegetables.  · Include lean proteins like poultry and fish.  How do I count calories when eating out?  · Ask for smaller portion sizes.  · Consider sharing an entree and sides instead of getting your own entree.  · If you get your own entree, eat only half. Ask for a box at the  beginning of your meal and put the rest of your entree in it so you are not tempted to eat it.  · If calories are listed on the menu, choose the lower calorie options.  · Choose dishes that include vegetables, fruits, whole grains, low-fat dairy products, and lean protein.  · Choose items that are boiled, broiled, grilled, or steamed. Stay away from items that are buttered, battered, fried, or served with cream sauce. Items labeled “crispy” are usually fried, unless stated otherwise.  · Choose water, low-fat milk, unsweetened iced tea, or other drinks without added sugar. If you want an alcoholic beverage, choose a lower calorie option such as a glass of wine or light beer.  · Ask for dressings, sauces, and syrups on the side. These are usually high in calories, so you should limit the amount you eat.  · If you want a salad, choose a garden salad and ask for grilled meats. Avoid extra toppings like zuleta, cheese, or fried items. Ask for the dressing on the side, or ask for olive oil and vinegar or lemon to use as dressing.  · Estimate how many servings of a food you are given. For example, a serving of cooked rice is ½ cup or about the size of half a baseball. Knowing serving sizes will help you be aware of how much food you are eating at restaurants. The list below tells you how big or small some common portion sizes are based on everyday objects:  ? 1 oz--4 stacked dice.  ? 3 oz--1 deck of cards.  ? 1 tsp--1 die.  ? 1 Tbsp--½ a ping-pong ball.  ? 2 Tbsp--1 ping-pong ball.  ? ½ cup--½ baseball.  ? 1 cup--1 baseball.  Summary  · Calorie counting means keeping track of how many calories you eat and drink each day. If you eat fewer calories than your body needs, you should lose weight.  · A healthy amount of weight to lose per week is usually 1-2 lb (0.5-0.9 kg). This usually means reducing your daily calorie intake by 500-750 calories.  · The number of calories in a food can be found on a Nutrition Facts label. If a  food does not have a Nutrition Facts label, try to look up the calories online or ask your dietitian for help.  · Use your calories on foods and drinks that will fill you up, and not on foods and drinks that will leave you hungry.  · Use smaller plates, glasses, and bowls to prevent overeating.  This information is not intended to replace advice given to you by your health care provider. Make sure you discuss any questions you have with your health care provider.  Document Released: 2006 Document Revised: 2017 Document Reviewed: 2017  [employer] Interactive Patient Education © 2018 [employer]  Tdap Vaccine (Tetanus, Diphtheria and Pertussis): What You Need to Know  1. Why get vaccinated?  Tetanus, diphtheria and pertussis are very serious diseases. Tdap vaccine can protect us from these diseases. And, Tdap vaccine given to pregnant women can protect  babies against pertussis.  TETANUS (Lockjaw) is rare in the [place] today. It causes painful muscle tightening and stiffness, usually all over the body.  · It can lead to tightening of muscles in the head and neck so you can't open your mouth, swallow, or sometimes even breathe. Tetanus kills about 1 out of 10 people who are infected even after receiving the best medical care.    DIPHTHERIA is also rare in the [place] today. It can cause a thick coating to form in the back of the throat.  · It can lead to breathing problems, heart failure, paralysis, and death.    PERTUSSIS (Whooping Cough) causes severe coughing spells, which can cause difficulty breathing, vomiting and disturbed sleep.  · It can also lead to weight loss, incontinence, and rib fractures. Up to 2 in 100 adolescents and 5 in 100 adults with pertussis are hospitalized or have complications, which could include pneumonia or death.    These diseases are caused by bacteria. Diphtheria and pertussis are spread from person to person through secretions from coughing or  sneezing. Tetanus enters the body through cuts, scratches, or wounds.  Before vaccines, as many as 200,000 cases of diphtheria, 200,000 cases of pertussis, and hundreds of cases of tetanus, were reported in the [place] each year. Since vaccination began, reports of cases for tetanus and diphtheria have dropped by about 99% and for pertussis by about 80%.  2. Tdap vaccine  Tdap vaccine can protect adolescents and adults from tetanus, diphtheria, and pertussis. One dose of Tdap is routinely given at age 11 or 12. People who did not get Tdap at that age should get it as soon as possible.  Tdap is especially important for healthcare professionals and anyone having close contact with a baby younger than 12 months.  Pregnant women should get a dose of Tdap during every pregnancy, to protect the  from pertussis. Infants are most at risk for severe, life-threatening complications from pertussis.  Another vaccine, called Td, protects against tetanus and diphtheria, but not pertussis. A Td booster should be given every 10 years. Tdap may be given as one of these boosters if you have never gotten Tdap before. Tdap may also be given after a severe cut or burn to prevent tetanus infection.  Your doctor or the person giving you the vaccine can give you more information.  Tdap may safely be given at the same time as other vaccines.  3. Some people should not get this vaccine  · A person who has ever had a life-threatening allergic reaction after a previous dose of any diphtheria, tetanus or pertussis containing vaccine, OR has a severe allergy to any part of this vaccine, should not get Tdap vaccine. Tell the person giving the vaccine about any severe allergies.  · Anyone who had coma or long repeated seizures within 7 days after a childhood dose of DTP or DTaP, or a previous dose of Tdap, should not get Tdap, unless a cause other than the vaccine was found. They can still get Td.  · Talk to your doctor if  you:  ? have seizures or another nervous system problem,  ? had severe pain or swelling after any vaccine containing diphtheria, tetanus or pertussis,  ? ever had a condition called Guillain-Barré Syndrome (GBS),  ? aren't feeling well on the day the shot is scheduled.  4. Risks  With any medicine, including vaccines, there is a chance of side effects. These are usually mild and go away on their own. Serious reactions are also possible but are rare.  Most people who get Tdap vaccine do not have any problems with it.  Mild problems following Tdap:  (Did not interfere with activities)  · Pain where the shot was given (about 3 in 4 adolescents or 2 in 3 adults)  · Redness or swelling where the shot was given (about 1 person in 5)  · Mild fever of at least 100.4°F (up to about 1 in 25 adolescents or 1 in 100 adults)  · Headache (about 3 or 4 people in 10)  · Tiredness (about 1 person in 3 or 4)  · Nausea, vomiting, diarrhea, stomach ache (up to 1 in 4 adolescents or 1 in 10 adults)  · Chills, sore joints (about 1 person in 10)  · Body aches (about 1 person in 3 or 4)  · Rash, swollen glands (uncommon)    Moderate problems following Tdap:  (Interfered with activities, but did not require medical attention)  · Pain where the shot was given (up to 1 in 5 or 6)  · Redness or swelling where the shot was given (up to about 1 in 16 adolescents or 1 in 12 adults)  · Fever over 102°F (about 1 in 100 adolescents or 1 in 250 adults)  · Headache (about 1 in 7 adolescents or 1 in 10 adults)  · Nausea, vomiting, diarrhea, stomach ache (up to 1 or 3 people in 100)  · Swelling of the entire arm where the shot was given (up to about 1 in 500).    Severe problems following Tdap:  (Unable to perform usual activities; required medical attention)  · Swelling, severe pain, bleeding and redness in the arm where the shot was given (rare).    Problems that could happen after any vaccine:  · People sometimes faint after a medical procedure,  including vaccination. Sitting or lying down for about 15 minutes can help prevent fainting, and injuries caused by a fall. Tell your doctor if you feel dizzy, or have vision changes or ringing in the ears.  · Some people get severe pain in the shoulder and have difficulty moving the arm where a shot was given. This happens very rarely.  · Any medication can cause a severe allergic reaction. Such reactions from a vaccine are very rare, estimated at fewer than 1 in a million doses, and would happen within a few minutes to a few hours after the vaccination.  As with any medicine, there is a very remote chance of a vaccine causing a serious injury or death.  The safety of vaccines is always being monitored. For more information, visit: www.cdc.gov/vaccinesafety/  5. What if there is a serious problem?  What should I look for?  Look for anything that concerns you, such as signs of a severe allergic reaction, very high fever, or unusual behavior.  Signs of a severe allergic reaction can include hives, swelling of the face and throat, difficulty breathing, a fast heartbeat, dizziness, and weakness. These would usually start a few minutes to a few hours after the vaccination.  What should I do?  · If you think it is a severe allergic reaction or other emergency that can’t wait, call 9-1-1 or get the person to the nearest hospital. Otherwise, call your doctor.  · Afterward, the reaction should be reported to the Vaccine Adverse Event Reporting System (VAERS). Your doctor might file this report, or you can do it yourself through the VAERS web site at www.vaers.hhs.gov, or by calling 1-220.408.1092.  ? VAERS does not give medical advice.  6. The National Vaccine Injury Compensation Program  The National Vaccine Injury Compensation Program (VICP) is a federal program that was created to compensate people who may have been injured by certain vaccines.  Persons who believe they may have been injured by a vaccine can learn about  the program and about filing a claim by calling 1-445.252.5053 or visiting the VICP website at www.[facility].gov/vaccinecompensation. There is a time limit to file a claim for compensation.  7. How can I learn more?  · Ask your doctor. He or she can give you the vaccine package insert or suggest other sources of information.  · Call your local or state health department.  · Contact the Centers for Disease Control and Prevention (CDC):  ? Call 1-987.313.2014 (2-324-TCP-INFO) or  ? Visit CDC’s website at www.cdc.gov/vaccines  CDC Tdap Vaccine VIS (2/24/15)  This information is not intended to replace advice given to you by your health care provider. Make sure you discuss any questions you have with your health care provider.  Document Released: 06/18/2013 Document Revised: 09/07/2017 Document Reviewed: 09/07/2017  [employer] Interactive Patient Education © 2017 [employer]

## 2018-06-15 NOTE — PROGRESS NOTES
Subjective   Emma Kyle is a 37 y.o. female.     Chief Complaint   Patient presents with   • Anxiety   • Bloated     off & ON X 1 WK         In for annual preventative exam.  Sleeps 6-7 hours per night.  At some exercise every day walking the dogs a few miles.  Energy is good.  Diet is well-balanced.      Anxiety   Presents for follow-up visit. Symptoms include nervous/anxious behavior. Patient reports no chest pain, confusion, depressed mood, dizziness, nausea, palpitations, panic or shortness of breath. Symptoms occur occasionally. The severity of symptoms is mild. The quality of sleep is good. Nighttime awakenings: none.            The following portions of the patient's history were reviewed and updated as appropriate: allergies, current medications, past social history and problem list.    Outpatient Prescriptions Marked as Taking for the 6/15/18 encounter (Office Visit) with Kalpesh Fair MD   Medication Sig Dispense Refill   • loratadine (ALAVERT) 10 MG tablet Take 10 mg by mouth Daily.     • phentermine 37.5 MG capsule Take 37.5 mg by mouth Every Morning.     • propranolol (INDERAL) 40 MG tablet Take 1 tablet by mouth 3 (Three) Times a Day As Needed (Anxiety). 30 tablet 2   • triamterene-hydrochlorothiazide (MAXZIDE) 75-50 MG per tablet Take 0.5 tablets by mouth Daily As Needed (swelling).         Review of Systems   Constitutional: Negative for appetite change, chills, fatigue and fever.   HENT: Negative for congestion, ear pain, hearing loss, nosebleeds, postnasal drip, rhinorrhea, sinus pressure and trouble swallowing.    Eyes: Negative for pain, itching and visual disturbance.   Respiratory: Negative for cough, chest tightness, shortness of breath and wheezing.    Cardiovascular: Negative for chest pain, palpitations and leg swelling.   Gastrointestinal: Negative for abdominal pain, anal bleeding, constipation, diarrhea, nausea, rectal pain and vomiting.   Endocrine: Negative for cold intolerance,  heat intolerance and polyuria.   Genitourinary: Negative for difficulty urinating, dysuria, flank pain, frequency, hematuria and urgency.   Musculoskeletal: Positive for arthralgias (left ankle). Negative for back pain and myalgias.   Skin: Negative for rash.   Allergic/Immunologic: Positive for environmental allergies.   Neurological: Negative for dizziness, syncope, speech difficulty, weakness, light-headedness, numbness and headaches.   Hematological: Does not bruise/bleed easily.   Psychiatric/Behavioral: Negative for agitation, confusion and sleep disturbance. The patient is nervous/anxious.        Objective   Vitals:    06/15/18 1537   BP: 116/86   Pulse: 117   Resp: 16   Temp: 98.9 °F (37.2 °C)   SpO2: 97%      1    06/15/18  1537   Weight: 73.7 kg (162 lb 6.4 oz)    [unfilled]  Body mass index is 25.43 kg/m².      Physical Exam   Constitutional: She is oriented to person, place, and time. She appears well-developed and well-nourished.   HENT:   Head: Normocephalic and atraumatic.   Right Ear: External ear normal.   Left Ear: External ear normal.   Nose: Nose normal.   Mouth/Throat: Oropharynx is clear and moist.   Eyes: Conjunctivae and EOM are normal. Pupils are equal, round, and reactive to light.   Neck: Normal range of motion. Neck supple. No JVD present. No thyromegaly present.   Cardiovascular: Normal rate, regular rhythm, normal heart sounds and intact distal pulses.  Exam reveals no gallop.    No murmur heard.  Pulmonary/Chest: Effort normal and breath sounds normal. No respiratory distress. She has no wheezes. She has no rales.   Abdominal: Soft. Bowel sounds are normal. She exhibits no distension and no mass. There is no tenderness. There is no guarding. No hernia.   Musculoskeletal: Normal range of motion. She exhibits no edema.   Lymphadenopathy:     She has no cervical adenopathy.   Neurological: She is alert and oriented to person, place, and time. She displays normal reflexes. No cranial  nerve deficit. Coordination normal.   Skin: Skin is warm and dry.   Psychiatric: She has a normal mood and affect. Her behavior is normal. Judgment and thought content normal.   Nursing note and vitals reviewed.        Problem List Items Addressed This Visit        Other    Anxiety - Primary        Assessment/Plan   She is in for annual preventative exam and recheck on anxiety.  She's been off of tobacco for 11 months and off of Chantix.  Doing very well with her tobacco cessation.   She is also having pain in left ankle.  Minimal now. She still has periodic and intermittent anxiety.  Generally at work.  Inderal when necessary works great for her.  She has a rare drink of alcohol when she's out in social settings.  We discussed trying to avoid that.  Spent 15 this with patient today, over 50% counseling her on her anxiety and substances.  Weight is up 50 pounds with tobacco cessation.  Down 36 pounds with weight loss program including phentermine.  Recheck anxiety in 4 months.  She's had several days of obstipation.  Just not moving bowels.  Some vague gas bloat.  She's tried some over-the-counter laxatives.  Advised MiraLAX 1 scoop daily in a glass of water.  Go ahead and stop Maxide which she's been taking to help with her weight loss program.  She is due for TD AP and Pap smear.  Getting lab work with the weight loss program January 2018.         Dragon disclaimer:   Much of this encounter note is an electronic transcription/translation of spoken language to printed text. The electronic translation of spoken language may permit erroneous, or at times, nonsensical words or phrases to be inadvertently transcribed; Although I have reviewed the note for such errors, some may still exist.

## 2018-10-19 ENCOUNTER — OFFICE VISIT (OUTPATIENT)
Dept: INTERNAL MEDICINE | Facility: CLINIC | Age: 38
End: 2018-10-19

## 2018-10-19 VITALS
RESPIRATION RATE: 16 BRPM | HEIGHT: 67 IN | TEMPERATURE: 98.9 F | SYSTOLIC BLOOD PRESSURE: 136 MMHG | BODY MASS INDEX: 26.21 KG/M2 | OXYGEN SATURATION: 99 % | WEIGHT: 167 LBS | DIASTOLIC BLOOD PRESSURE: 88 MMHG | HEART RATE: 93 BPM

## 2018-10-19 DIAGNOSIS — F41.9 ANXIETY: Primary | ICD-10-CM

## 2018-10-19 PROCEDURE — 99213 OFFICE O/P EST LOW 20 MIN: CPT | Performed by: INTERNAL MEDICINE

## 2018-10-19 RX ORDER — PROPRANOLOL HYDROCHLORIDE 40 MG/1
40 TABLET ORAL 3 TIMES DAILY PRN
Qty: 30 TABLET | Refills: 2 | Status: SHIPPED | OUTPATIENT
Start: 2018-10-19 | End: 2019-04-18 | Stop reason: SDUPTHER

## 2018-10-19 RX ORDER — TRIAMTERENE AND HYDROCHLOROTHIAZIDE 75; 50 MG/1; MG/1
1 TABLET ORAL DAILY
Qty: 90 TABLET | Refills: 1 | Status: SHIPPED | OUTPATIENT
Start: 2018-10-19 | End: 2019-04-12 | Stop reason: SDUPTHER

## 2018-10-19 NOTE — PROGRESS NOTES
Subjective   Emma Kyle is a 38 y.o. female.     Chief Complaint   Patient presents with   • Anxiety         Anxiety   Presents for follow-up visit. Symptoms include nervous/anxious behavior. Patient reports no chest pain, depressed mood, nausea, palpitations, panic or shortness of breath. Symptoms occur occasionally. The severity of symptoms is mild. The quality of sleep is good. Nighttime awakenings: none.            The following portions of the patient's history were reviewed and updated as appropriate: allergies, current medications, past social history and problem list.    Outpatient Prescriptions Marked as Taking for the 10/19/18 encounter (Office Visit) with Kalpesh Fair MD   Medication Sig Dispense Refill   • loratadine (ALAVERT) 10 MG tablet Take 10 mg by mouth Daily.     • propranolol (INDERAL) 40 MG tablet Take 1 tablet by mouth 3 (Three) Times a Day As Needed (Anxiety). 30 tablet 2   • triamterene-hydrochlorothiazide (MAXZIDE) 75-50 MG per tablet Take 0.5 tablets by mouth Daily.         Review of Systems   Constitutional: Negative for chills and fever.   Respiratory: Negative for shortness of breath.    Cardiovascular: Negative for chest pain and palpitations.   Gastrointestinal: Negative for constipation, diarrhea, nausea and vomiting.   Musculoskeletal: Positive for arthralgias (left ankle).   Psychiatric/Behavioral: The patient is nervous/anxious.        Objective   Vitals:    10/19/18 1529   BP: 136/88   Pulse: 93   Resp: 16   Temp: 98.9 °F (37.2 °C)   SpO2: 99%      1    10/19/18  1529   Weight: 75.8 kg (167 lb)    [unfilled]  Body mass index is 26.15 kg/m².      Physical Exam   Constitutional: She appears well-developed and well-nourished.   Psychiatric: She has a normal mood and affect. Her behavior is normal. Judgment and thought content normal.         Problem List Items Addressed This Visit        Other    Anxiety - Primary        Assessment/Plan   She is in for recheck on anxiety.   She's been off of tobacco for 5 months and off of Chantix.  Doing very well with her tobacco cessation.   She is also having pain in left ankle.  Minimal now. She still has periodic and intermittent anxiety.  Generally at work.  Inderal when necessary great for her.  She has a rare drink of alcohol when she's out in social settings.  We discussed trying to avoid that.  Spent 15 this with patient today, over 50% counseling her on her anxiety and substances.  Recheck anxiety in 6 months.         Dragon disclaimer:   Much of this encounter note is an electronic transcription/translation of spoken language to printed text. The electronic translation of spoken language may permit erroneous, or at times, nonsensical words or phrases to be inadvertently transcribed; Although I have reviewed the note for such errors, some may still exist.

## 2019-04-12 RX ORDER — TRIAMTERENE AND HYDROCHLOROTHIAZIDE 75; 50 MG/1; MG/1
TABLET ORAL
Qty: 90 TABLET | Refills: 1 | Status: SHIPPED | OUTPATIENT
Start: 2019-04-12 | End: 2019-04-18 | Stop reason: SDUPTHER

## 2019-04-18 ENCOUNTER — OFFICE VISIT (OUTPATIENT)
Dept: INTERNAL MEDICINE | Facility: CLINIC | Age: 39
End: 2019-04-18

## 2019-04-18 VITALS
HEIGHT: 67 IN | HEART RATE: 84 BPM | OXYGEN SATURATION: 100 % | DIASTOLIC BLOOD PRESSURE: 78 MMHG | WEIGHT: 169.2 LBS | SYSTOLIC BLOOD PRESSURE: 110 MMHG | TEMPERATURE: 98.7 F | RESPIRATION RATE: 16 BRPM | BODY MASS INDEX: 26.56 KG/M2

## 2019-04-18 DIAGNOSIS — F41.9 ANXIETY: Primary | ICD-10-CM

## 2019-04-18 PROCEDURE — 99213 OFFICE O/P EST LOW 20 MIN: CPT | Performed by: INTERNAL MEDICINE

## 2019-04-18 RX ORDER — TRIAMTERENE AND HYDROCHLOROTHIAZIDE 75; 50 MG/1; MG/1
1 TABLET ORAL DAILY
Qty: 90 TABLET | Refills: 1 | Status: SHIPPED | OUTPATIENT
Start: 2019-04-18 | End: 2020-03-23

## 2019-04-18 RX ORDER — PROPRANOLOL HYDROCHLORIDE 40 MG/1
40 TABLET ORAL 3 TIMES DAILY PRN
Qty: 30 TABLET | Refills: 2 | Status: SHIPPED | OUTPATIENT
Start: 2019-04-18 | End: 2019-07-12 | Stop reason: SDUPTHER

## 2019-04-18 NOTE — PROGRESS NOTES
Subjective   Emma Kyle is a 38 y.o. female.     Chief Complaint   Patient presents with   • Anxiety         Anxiety   Presents for follow-up visit. Symptoms include nervous/anxious behavior. Patient reports no chest pain, depressed mood, nausea, palpitations, panic or shortness of breath. Symptoms occur occasionally. The severity of symptoms is mild. The quality of sleep is good. Nighttime awakenings: none.            The following portions of the patient's history were reviewed and updated as appropriate: allergies, current medications, past social history and problem list.    Outpatient Medications Marked as Taking for the 4/18/19 encounter (Office Visit) with Kalpesh Fair MD   Medication Sig Dispense Refill   • loratadine (ALAVERT) 10 MG tablet Take 10 mg by mouth Daily.     • propranolol (INDERAL) 40 MG tablet Take 1 tablet by mouth 3 (Three) Times a Day As Needed (Anxiety). 30 tablet 2   • triamterene-hydrochlorothiazide (MAXZIDE) 75-50 MG per tablet TAKE 1 TABLET BY MOUTH EVERY DAY 90 tablet 1       Review of Systems   Constitutional: Negative for chills and fever.   Respiratory: Negative for shortness of breath.    Cardiovascular: Negative for chest pain and palpitations.   Gastrointestinal: Negative for constipation, diarrhea, nausea and vomiting.   Musculoskeletal: Positive for arthralgias (left ankle).   Psychiatric/Behavioral: The patient is nervous/anxious.        Objective   Vitals:    04/18/19 1512   BP: 110/78   Pulse: 84   Resp: 16   Temp: 98.7 °F (37.1 °C)   SpO2: 100%          04/18/19  1512   Weight: 76.7 kg (169 lb 3.2 oz)    [unfilled]  Body mass index is 26.49 kg/m².      Physical Exam   Constitutional: She appears well-developed and well-nourished.   Psychiatric: She has a normal mood and affect. Her behavior is normal. Judgment and thought content normal.         Problem List Items Addressed This Visit        Other    Anxiety - Primary        Assessment/Plan   She is in for recheck  on anxiety.  She's been off of tobacco for 20 months and off of Chantix.  Doing very well with her tobacco cessation.   She is also having pain in left ankle.  Minimal now. She still has periodic and intermittent anxiety.  Generally at work.  Inderal when necessary great for her.  She has a rare drink of alcohol when she's out in social settings.  We discussed trying to avoid that.  Spent 15 this with patient today, over 50% counseling her on her anxiety and substances.  Recheck anxiety in 6 months.           Aurelio disclaimer:   Much of this encounter note is an electronic transcription/translation of spoken language to printed text. The electronic translation of spoken language may permit erroneous, or at times, nonsensical words or phrases to be inadvertently transcribed; Although I have reviewed the note for such errors, some may still exist.

## 2019-04-18 NOTE — PATIENT INSTRUCTIONS
Exercising to Lose Weight  Exercising can help you to lose weight. In order to lose weight through exercise, you need to do vigorous-intensity exercise. You can tell that you are exercising with vigorous intensity if you are breathing very hard and fast and cannot hold a conversation while exercising.  Moderate-intensity exercise helps to maintain your current weight. You can tell that you are exercising at a moderate level if you have a higher heart rate and faster breathing, but you are still able to hold a conversation.  How often should I exercise?  Choose an activity that you enjoy and set realistic goals. Your health care provider can help you to make an activity plan that works for you. Exercise regularly as directed by your health care provider. This may include:  · Doing resistance training twice each week, such as:  ? Push-ups.  ? Sit-ups.  ? Lifting weights.  ? Using resistance bands.  · Doing a given intensity of exercise for a given amount of time. Choose from these options:  ? 150 minutes of moderate-intensity exercise every week.  ? 75 minutes of vigorous-intensity exercise every week.  ? A mix of moderate-intensity and vigorous-intensity exercise every week.    Children, pregnant women, people who are out of shape, people who are overweight, and older adults may need to consult a health care provider for individual recommendations. If you have any sort of medical condition, be sure to consult your health care provider before starting a new exercise program.  What are some activities that can help me to lose weight?  · Walking at a rate of at least 4.5 miles an hour.  · Jogging or running at a rate of 5 miles per hour.  · Biking at a rate of at least 10 miles per hour.  · Lap swimming.  · Roller-skating or in-line skating.  · Cross-country skiing.  · Vigorous competitive sports, such as football, basketball, and soccer.  · Jumping rope.  · Aerobic dancing.  How can I be more active in my day-to-day  activities?  · Use the stairs instead of the elevator.  · Take a walk during your lunch break.  · If you drive, park your car farther away from work or school.  · If you take public transportation, get off one stop early and walk the rest of the way.  · Make all of your phone calls while standing up and walking around.  · Get up, stretch, and walk around every 30 minutes throughout the day.  What guidelines should I follow while exercising?  · Do not exercise so much that you hurt yourself, feel dizzy, or get very short of breath.  · Consult your health care provider prior to starting a new exercise program.  · Wear comfortable clothes and shoes with good support.  · Drink plenty of water while you exercise to prevent dehydration or heat stroke. Body water is lost during exercise and must be replaced.  · Work out until you breathe faster and your heart beats faster.  This information is not intended to replace advice given to you by your health care provider. Make sure you discuss any questions you have with your health care provider.  Document Released: 01/20/2012 Document Revised: 05/25/2017 Document Reviewed: 05/21/2015  [employer] Interactive Patient Education © 2019 [employer]  BMI for Adults  Body mass index (BMI) is a number that is calculated from a person's weight and height. In most adults, the number is used to find how much of an adult's weight is made up of fat. BMI is not as accurate as a direct measure of body fat.  How is BMI calculated?  BMI is calculated by dividing weight in kilograms by height in meters squared. It can also be calculated by dividing weight in pounds by height in inches squared, then multiplying the resulting number by 703. Charts are available to help you find your BMI quickly and easily without doing this calculation.  How is BMI interpreted?  Health care professionals use BMI charts to identify whether an adult is underweight, at a normal weight, or overweight based on the  following guidelines:  · Underweight: BMI less than 18.5.  · Normal weight: BMI between 18.5 and 24.9.  · Overweight: BMI between 25 and 29.9.  · Obese: BMI of 30 and above.    BMI is usually interpreted the same for males and females.  Weight includes both fat and muscle, so someone with a muscular build, such as an athlete, may have a BMI that is higher than 24.9. In cases like these, BMI may not accurately depict body fat. To determine if excess body fat is the cause of a BMI of 25 or higher, further assessments may need to be done by a health care provider.  Why is BMI a useful tool?  BMI is used to identify a possible weight problem that may be related to a medical problem or may increase the risk for medical problems. BMI can also be used to promote changes to reach a healthy weight.  This information is not intended to replace advice given to you by your health care provider. Make sure you discuss any questions you have with your health care provider.  Document Released: 08/29/2005 Document Revised: 04/27/2017 Document Reviewed: 05/15/2015  [employer] Interactive Patient Education © 2018 [employer]

## 2019-07-12 RX ORDER — PROPRANOLOL HYDROCHLORIDE 40 MG/1
40 TABLET ORAL 3 TIMES DAILY PRN
Qty: 90 TABLET | Refills: 0 | Status: SHIPPED | OUTPATIENT
Start: 2019-07-12 | End: 2019-08-09 | Stop reason: SDUPTHER

## 2019-08-09 RX ORDER — PROPRANOLOL HYDROCHLORIDE 40 MG/1
40 TABLET ORAL 3 TIMES DAILY PRN
Qty: 90 TABLET | Refills: 0 | Status: SHIPPED | OUTPATIENT
Start: 2019-08-09 | End: 2019-09-08 | Stop reason: SDUPTHER

## 2019-09-09 RX ORDER — PROPRANOLOL HYDROCHLORIDE 40 MG/1
40 TABLET ORAL 3 TIMES DAILY PRN
Qty: 90 TABLET | Refills: 0 | Status: SHIPPED | OUTPATIENT
Start: 2019-09-09 | End: 2020-10-21 | Stop reason: SDUPTHER

## 2020-03-23 RX ORDER — TRIAMTERENE AND HYDROCHLOROTHIAZIDE 75; 50 MG/1; MG/1
TABLET ORAL
Qty: 90 TABLET | Refills: 1 | Status: SHIPPED | OUTPATIENT
Start: 2020-03-23 | End: 2020-10-21 | Stop reason: SDUPTHER

## 2020-10-21 RX ORDER — PROPRANOLOL HYDROCHLORIDE 40 MG/1
40 TABLET ORAL 3 TIMES DAILY PRN
Qty: 30 TABLET | Refills: 0 | Status: SHIPPED | OUTPATIENT
Start: 2020-10-21 | End: 2021-01-21

## 2020-10-21 RX ORDER — TRIAMTERENE AND HYDROCHLOROTHIAZIDE 75; 50 MG/1; MG/1
1 TABLET ORAL DAILY
Qty: 30 TABLET | Refills: 0 | Status: SHIPPED | OUTPATIENT
Start: 2020-10-21 | End: 2020-10-29 | Stop reason: SDUPTHER

## 2020-10-29 ENCOUNTER — TELEMEDICINE (OUTPATIENT)
Dept: INTERNAL MEDICINE | Facility: CLINIC | Age: 40
End: 2020-10-29

## 2020-10-29 DIAGNOSIS — I10 ESSENTIAL HYPERTENSION: Primary | ICD-10-CM

## 2020-10-29 DIAGNOSIS — F41.9 ANXIETY: ICD-10-CM

## 2020-10-29 PROCEDURE — 99213 OFFICE O/P EST LOW 20 MIN: CPT | Performed by: INTERNAL MEDICINE

## 2020-10-29 RX ORDER — TRIAMTERENE AND HYDROCHLOROTHIAZIDE 75; 50 MG/1; MG/1
1 TABLET ORAL DAILY
Qty: 90 TABLET | Refills: 3 | Status: SHIPPED | OUTPATIENT
Start: 2020-10-29 | End: 2021-10-13

## 2020-10-29 NOTE — PROGRESS NOTES
Subjective   Emma Villafana is a 40 y.o. female.     No chief complaint on file.        Anxiety  Presents for follow-up visit. Symptoms include nervous/anxious behavior. Patient reports no chest pain, depressed mood, nausea, palpitations, panic or shortness of breath. Symptoms occur occasionally. The severity of symptoms is mild. The quality of sleep is good. Nighttime awakenings: none.            The following portions of the patient's history were reviewed and updated as appropriate: allergies, current medications, past social history and problem list.    No outpatient medications have been marked as taking for the 10/29/20 encounter (Appointment) with Kalpesh Fair MD.       Review of Systems   Respiratory: Negative for shortness of breath and wheezing.    Cardiovascular: Negative for chest pain and palpitations.   Gastrointestinal: Negative for constipation, diarrhea, nausea and vomiting.   Musculoskeletal: Arthralgias: left ankle.   Psychiatric/Behavioral: The patient is nervous/anxious.        Objective   There were no vitals filed for this visit.   There were no vitals filed for this visit. [unfilled]  There is no height or weight on file to calculate BMI.      Physical Exam   Constitutional: She appears well-developed.   Abdominal: Normal appearance.   Neurological: She is alert.   Psychiatric: Her behavior is normal. Mood, judgment and thought content normal.         Problems Addressed this Visit        Cardiovascular and Mediastinum    Essential hypertension - Primary       Other    Anxiety      Diagnoses       Codes Comments    Essential hypertension    -  Primary ICD-10-CM: I10  ICD-9-CM: 401.9     Anxiety     ICD-10-CM: F41.9  ICD-9-CM: 300.00         Assessment/Plan   Video visit today due to Covid pandemic.  She is in for recheck on anxiety.  She's been off of tobacco since January 2019.  Doing very well with her tobacco cessation.   Left ankle cleared up after last visit.  She still has  periodic and intermittent anxiety.  Generally at work.  Inderal when necessary works great for her.  She rarely takes it now.  She left her job and most of her anxiety and stress has resolved with that.  She has a rare drink of alcohol when she's out in social settings.  She lost 30 to 40 pounds on her diuretic along with diet.  Recheck anxiety in 12 months.  When she gets her health insurance we will check some lab work including CBC, CMP, lipids, UA, TSH, Hep C screen.  Annual preventive exam when she gets health insurance.  Spent 15 minutes with patient on visit today.         Dragon disclaimer:   Much of this encounter note is an electronic transcription/translation of spoken language to printed text. The electronic translation of spoken language may permit erroneous, or at times, nonsensical words or phrases to be inadvertently transcribed; Although I have reviewed the note for such errors, some may still exist.

## 2021-01-21 RX ORDER — PROPRANOLOL HYDROCHLORIDE 40 MG/1
40 TABLET ORAL 3 TIMES DAILY PRN
Qty: 90 TABLET | Refills: 0 | Status: SHIPPED | OUTPATIENT
Start: 2021-01-21

## 2021-04-06 ENCOUNTER — BULK ORDERING (OUTPATIENT)
Dept: CASE MANAGEMENT | Facility: OTHER | Age: 41
End: 2021-04-06

## 2021-04-06 DIAGNOSIS — Z23 IMMUNIZATION DUE: ICD-10-CM

## 2021-08-04 ENCOUNTER — TELEMEDICINE (OUTPATIENT)
Dept: INTERNAL MEDICINE | Facility: CLINIC | Age: 41
End: 2021-08-04

## 2021-08-04 ENCOUNTER — TELEPHONE (OUTPATIENT)
Dept: INTERNAL MEDICINE | Facility: CLINIC | Age: 41
End: 2021-08-04

## 2021-08-04 DIAGNOSIS — F41.9 ANXIETY: Primary | ICD-10-CM

## 2021-08-04 PROCEDURE — 99213 OFFICE O/P EST LOW 20 MIN: CPT | Performed by: INTERNAL MEDICINE

## 2021-08-04 RX ORDER — GABAPENTIN 100 MG/1
100 CAPSULE ORAL 3 TIMES DAILY
Qty: 90 CAPSULE | Refills: 0 | Status: SHIPPED | OUTPATIENT
Start: 2021-08-04 | End: 2021-09-08 | Stop reason: SDUPTHER

## 2021-08-04 NOTE — PROGRESS NOTES
Subjective   Emma Villafana is a 41 y.o. female.     Chief Complaint   Patient presents with   • Anxiety         Anxiety  Presents for follow-up visit. Symptoms include nervous/anxious behavior and panic. Patient reports no chest pain, depressed mood, nausea, palpitations or shortness of breath. Symptoms occur occasionally. The severity of symptoms is mild and causing significant distress.            The following portions of the patient's history were reviewed and updated as appropriate: allergies, current medications, past social history and problem list.    Outpatient Medications Marked as Taking for the 8/4/21 encounter (Telemedicine) with Kalpesh Fair MD   Medication Sig Dispense Refill   • loratadine (ALAVERT) 10 MG tablet Take 10 mg by mouth Daily.     • propranolol (INDERAL) 40 MG tablet TAKE 1 TABLET BY MOUTH 3 (THREE) TIMES A DAY AS NEEDED (ANXIETY). 90 tablet 0   • triamterene-hydrochlorothiazide (MAXZIDE) 75-50 MG per tablet Take 1 tablet by mouth Daily. 90 tablet 3       Review of Systems   Respiratory: Negative for shortness of breath and wheezing.    Cardiovascular: Negative for chest pain and palpitations.   Gastrointestinal: Negative for constipation, diarrhea, nausea and vomiting.   Musculoskeletal: Arthralgias: left ankle.   Psychiatric/Behavioral: The patient is nervous/anxious.        Objective   There were no vitals filed for this visit.   There were no vitals filed for this visit. [unfilled]  There is no height or weight on file to calculate BMI.      Physical Exam   Constitutional: She appears well-developed.   Abdominal: Normal appearance.   Neurological: She is alert.   Psychiatric: Her behavior is normal. Mood, judgment and thought content normal.          Problems Addressed this Visit        Mental Health    Anxiety - Primary    Relevant Medications    gabapentin (NEURONTIN) 100 MG capsule      Diagnoses       Codes Comments    Anxiety    -  Primary ICD-10-CM: F41.9  ICD-9-CM:  300.00         Assessment/Plan   Video visit today due to Covid pandemic.  She is in for recheck on anxiety.  She's been off of tobacco since January 2019.  Doing very well with her tobacco cessation.   She still has periodic and intermittent anxiety.  Recent panic attacks past few days and went to [facility] not holding her well enough.  Generally at work.  She left her job and most of her anxiety and stress has resolved with that.  New job now very stressful.  She has a rare drink of alcohol when she's out in social settings.  She is in a lot of distress at the present time with her anxiety.  We will begin on gabapentin 100 mg 3 times daily to try to get things under better control and then will start her on some Lexapro 10 mg daily.  Recheck in 2 weeks.  When she gets her health insurance we will check some lab work including CBC, CMP, lipids, UA, TSH, Hep C screen.  Annual preventive exam in 2 weeks with labs.  Now has health insurance so will catch up.  We will have her off work until her next visit to see if we can get her anxiety under control.  Spent 18 minutes with patient on visit today.     The above information was reviewed again today 08/04/21.  It continues to be accurate as reflected above and is unchanged.  History, physical and review of systems all reviewed and are unchanged.  Medications were reviewed today and continue the current dosing.         Dragon disclaimer:   Much of this encounter note is an electronic transcription/translation of spoken language to printed text. The electronic translation of spoken language may permit erroneous, or at times, nonsensical words or phrases to be inadvertently transcribed; Although I have reviewed the note for such errors, some may still exist.

## 2021-08-04 NOTE — TELEPHONE ENCOUNTER
PATIENT CALLING TO SEE IF THE WORK EXCUSE HAS BEEN PUT INTO [employer] TO BE  OFF WORK UNTIL 08/20.    PLEASE ADVISE  [phone]

## 2021-08-05 ENCOUNTER — TELEPHONE (OUTPATIENT)
Dept: INTERNAL MEDICINE | Facility: CLINIC | Age: 41
End: 2021-08-05

## 2021-08-05 RX ORDER — ESCITALOPRAM OXALATE 10 MG/1
10 TABLET ORAL DAILY
Qty: 90 TABLET | Refills: 1 | Status: SHIPPED | OUTPATIENT
Start: 2021-08-05 | End: 2021-09-17 | Stop reason: SDUPTHER

## 2021-08-05 NOTE — TELEPHONE ENCOUNTER
PATIENT STATES:THAT SHE WOULD LIKE A CALL BACK TO SEE IF SHE CAN START HER NEW MEDICINE NOW PLEASE ADVISE       PATIENT CAN BE REACHED ON: [phone]     [id number] [place], [place] - [facility] - [phone]  - [phone]   [phone]

## 2021-08-05 NOTE — TELEPHONE ENCOUNTER
I wanted to give her gabapentin a few days or a few weeks to get her acute symptoms under control prior to starting the Lexapro.  Having said that, it is perfectly okay to begin the Lexapro right now.  He will take several weeks for the Lexapro to kick in.

## 2021-08-20 ENCOUNTER — OFFICE VISIT (OUTPATIENT)
Dept: INTERNAL MEDICINE | Facility: CLINIC | Age: 41
End: 2021-08-20

## 2021-08-20 VITALS
HEART RATE: 107 BPM | BODY MASS INDEX: 23.7 KG/M2 | HEIGHT: 67 IN | TEMPERATURE: 96.4 F | OXYGEN SATURATION: 98 % | WEIGHT: 151 LBS | SYSTOLIC BLOOD PRESSURE: 114 MMHG | DIASTOLIC BLOOD PRESSURE: 82 MMHG | RESPIRATION RATE: 16 BRPM

## 2021-08-20 DIAGNOSIS — F41.9 ANXIETY: ICD-10-CM

## 2021-08-20 DIAGNOSIS — Z11.59 NEED FOR HEPATITIS C SCREENING TEST: ICD-10-CM

## 2021-08-20 DIAGNOSIS — Z00.00 ENCOUNTER FOR PREVENTIVE HEALTH EXAMINATION: Primary | ICD-10-CM

## 2021-08-20 PROBLEM — I10 ESSENTIAL HYPERTENSION: Chronic | Status: ACTIVE | Noted: 2020-10-29

## 2021-08-20 PROCEDURE — 99396 PREV VISIT EST AGE 40-64: CPT | Performed by: INTERNAL MEDICINE

## 2021-08-20 RX ORDER — IBUPROFEN 200 MG
200 TABLET ORAL DAILY
COMMUNITY
Start: 2020-12-17

## 2021-08-20 RX ORDER — BIOTIN 10000 MCG
10 CAPSULE ORAL DAILY
COMMUNITY
Start: 2021-07-14

## 2021-08-20 NOTE — PATIENT INSTRUCTIONS
Pneumococcal Polysaccharide Vaccine (PPSV23): What You Need to Know  1. Why get vaccinated?  Pneumococcal polysaccharide vaccine (PPSV23) can prevent pneumococcal disease.  Pneumococcal disease refers to any illness caused by pneumococcal bacteria. These bacteria can cause many types of illnesses, including pneumonia, which is an infection of the lungs. Pneumococcal bacteria are one of the most common causes of pneumonia.  Besides pneumonia, pneumococcal bacteria can also cause:  · Ear infections  · Sinus infections  · Meningitis (infection of the tissue covering the brain and spinal cord)  · Bacteremia (bloodstream infection)  Anyone can get pneumococcal disease, but children under 2 years of age, people with certain medical conditions, adults 65 years or older, and cigarette smokers are at the highest risk.  Most pneumococcal infections are mild. However, some can result in long-term problems, such as brain damage or hearing loss. Meningitis, bacteremia, and pneumonia caused by pneumococcal disease can be fatal.  2. PPSV23  PPSV23 protects against 23 types of bacteria that cause pneumococcal disease.  PPSV23 is recommended for:  · All adults 65 years or older,  · Anyone 2 years or older with certain medical conditions that can lead to an increased risk for pneumococcal disease.  Most people need only one dose of PPSV23. A second dose of PPSV23, and another type of pneumococcal vaccine called PCV13, are recommended for certain high-risk groups. Your health care provider can give you more information.  People 65 years or older should get a dose of PPSV23 even if they have already gotten one or more doses of the vaccine before they turned 65.  3. Talk with your health care provider  Tell your vaccine provider if the person getting the vaccine:  · Has had an allergic reaction after a previous dose of PPSV23, or has any severe, life-threatening allergies.  In some cases, your health care provider may decide to postpone  PPSV23 vaccination to a future visit.  People with minor illnesses, such as a cold, may be vaccinated. People who are moderately or severely ill should usually wait until they recover before getting PPSV23.  Your health care provider can give you more information.  4. Risks of a vaccine reaction  · Redness or pain where the shot is given, feeling tired, fever, or muscle aches can happen after PPSV23.  People sometimes faint after medical procedures, including vaccination. Tell your provider if you feel dizzy or have vision changes or ringing in the ears.  As with any medicine, there is a very remote chance of a vaccine causing a severe allergic reaction, other serious injury, or death.  5. What if there is a serious problem?  An allergic reaction could occur after the vaccinated person leaves the clinic. If you see signs of a severe allergic reaction (hives, swelling of the face and throat, difficulty breathing, a fast heartbeat, dizziness, or weakness), call 9-1-1 and get the person to the nearest hospital.  For other signs that concern you, call your health care provider.  Adverse reactions should be reported to the Vaccine Adverse Event Reporting System (VAERS). Your health care provider will usually file this report, or you can do it yourself. Visit the VAERS website at www.vaers.hhs.gov or call 1-266.237.1716. VAERS is only for reporting reactions, and VAERS staff do not give medical advice.  6. How can I learn more?  · Ask your health care provider.  · Call your local or state health department.  · Contact the Centers for Disease Control and Prevention (CDC):  ? Call 1-425.563.1266 (7-360-QPS-INFO) or  ? Visit CDC's website at www.cdc.gov/vaccines  Vaccine Information Statement PPSV23 Vaccine (10/30/2019)  This information is not intended to replace advice given to you by your health care provider. Make sure you discuss any questions you have with your health care provider.  Document Revised: 12/07/2020  "Document Reviewed: 2020  [employer] Patient Education ©  [employer]  https://www.cdc.gov/vaccines/hcp/vis/vis-statements/tdap.pdf\">   Tdap (Tetanus, Diphtheria, Pertussis) Vaccine: What You Need to Know  1. Why get vaccinated?  Tdap vaccine can prevent tetanus, diphtheria, and pertussis.  Diphtheria and pertussis spread from person to person. Tetanus enters the body through cuts or wounds.  · TETANUS (T) causes painful stiffening of the muscles. Tetanus can lead to serious health problems, including being unable to open the mouth, having trouble swallowing and breathing, or death.  · DIPHTHERIA (D) can lead to difficulty breathing, heart failure, paralysis, or death.  · PERTUSSIS (aP), also known as \"whooping cough,\" can cause uncontrollable, violent coughing which makes it hard to breathe, eat, or drink. Pertussis can be extremely serious in babies and young children, causing pneumonia, convulsions, brain damage, or death. In teens and adults, it can cause weight loss, loss of bladder control, passing out, and rib fractures from severe coughing.  2. Tdap vaccine  Tdap is only for children 7 years and older, adolescents, and adults.   Adolescents should receive a single dose of Tdap, preferably at age 11 or 12 years.  Pregnant women should get a dose of Tdap during every pregnancy, to protect the  from pertussis. Infants are most at risk for severe, life-threatening complications from pertussis.  Adults who have never received Tdap should get a dose of Tdap.  Also, adults should receive a booster dose every 10 years, or earlier in the case of a severe and dirty wound or burn. Booster doses can be either Tdap or Td (a different vaccine that protects against tetanus and diphtheria but not pertussis).  Tdap may be given at the same time as other vaccines.  3. Talk with your health care provider  Tell your vaccine provider if the person getting the vaccine:  · Has had an allergic reaction after a " previous dose of any vaccine that protects against tetanus, diphtheria, or pertussis, or has any severe, life-threatening allergies.  · Has had a coma, decreased level of consciousness, or prolonged seizures within 7 days after a previous dose of any pertussis vaccine (DTP, DTaP, or Tdap).  · Has seizures or another nervous system problem.  · Has ever had Guillain-Barré Syndrome (also called GBS).  · Has had severe pain or swelling after a previous dose of any vaccine that protects against tetanus or diphtheria.  In some cases, your health care provider may decide to postpone Tdap vaccination to a future visit.   People with minor illnesses, such as a cold, may be vaccinated. People who are moderately or severely ill should usually wait until they recover before getting Tdap vaccine.   Your health care provider can give you more information.  4. Risks of a vaccine reaction  · Pain, redness, or swelling where the shot was given, mild fever, headache, feeling tired, and nausea, vomiting, diarrhea, or stomachache sometimes happen after Tdap vaccine.  People sometimes faint after medical procedures, including vaccination. Tell your provider if you feel dizzy or have vision changes or ringing in the ears.   As with any medicine, there is a very remote chance of a vaccine causing a severe allergic reaction, other serious injury, or death.  5. What if there is a serious problem?  An allergic reaction could occur after the vaccinated person leaves the clinic. If you see signs of a severe allergic reaction (hives, swelling of the face and throat, difficulty breathing, a fast heartbeat, dizziness, or weakness), call 9-1-1 and get the person to the nearest hospital.  For other signs that concern you, call your health care provider.   Adverse reactions should be reported to the Vaccine Adverse Event Reporting System (VAERS). Your health care provider will usually file this report, or you can do it yourself. Visit the VAERS  website at www.vaers.[facility].gov or call 1-309.610.3645. VAERS is only for reporting reactions, and VAERS staff do not give medical advice.  6. The National Vaccine Injury Compensation Program  The National Vaccine Injury Compensation Program (VICP) is a federal program that was created to compensate people who may have been injured by certain vaccines. Visit the VICP website at www.[employer].gov/vaccinecompensation or call 1-279.431.4202 to learn about the program and about filing a claim. There is a time limit to file a claim for compensation.  7. How can I learn more?  · Ask your health care provider.  · Call your local or state health department.  · Contact the Centers for Disease Control and Prevention (CDC):  ? Call 1-255.461.8872 (1-555-GDP-INFO) or  ? Visit CDC's website at www.cdc.gov/vaccines  Vaccine Information Statement Tdap (Tetanus, Diphtheria, Pertussis) Vaccine (04/01/2020)  This information is not intended to replace advice given to you by your health care provider. Make sure you discuss any questions you have with your health care provider.  Document Revised: 04/10/2020 Document Reviewed: 04/13/2020  [employer] Patient Education © 2021 [employer]  Exercising to Stay Healthy  To become healthy and stay healthy, it is recommended that you do moderate-intensity and vigorous-intensity exercise. You can tell that you are exercising at a moderate intensity if your heart starts beating faster and you start breathing faster but can still hold a conversation. You can tell that you are exercising at a vigorous intensity if you are breathing much harder and faster and cannot hold a conversation while exercising.  Exercising regularly is important. It has many health benefits, such as:  · Improving overall fitness, flexibility, and endurance.  · Increasing bone density.  · Helping with weight control.  · Decreasing body fat.  · Increasing muscle strength.  · Reducing stress and tension.  · Improving overall  health.  How often should I exercise?  Choose an activity that you enjoy, and set realistic goals. Your health care provider can help you make an activity plan that works for you.  Exercise regularly as told by your health care provider. This may include:  · Doing strength training two times a week, such as:  ? Lifting weights.  ? Using resistance bands.  ? Push-ups.  ? Sit-ups.  ? Yoga.  · Doing a certain intensity of exercise for a given amount of time. Choose from these options:  ? A total of 150 minutes of moderate-intensity exercise every week.  ? A total of 75 minutes of vigorous-intensity exercise every week.  ? A mix of moderate-intensity and vigorous-intensity exercise every week.  Children, pregnant women, people who have not exercised regularly, people who are overweight, and older adults may need to talk with a health care provider about what activities are safe to do. If you have a medical condition, be sure to talk with your health care provider before you start a new exercise program.  What are some exercise ideas?  Moderate-intensity exercise ideas include:  · Walking 1 mile (1.6 km) in about 15 minutes.  · Biking.  · Hiking.  · Golfing.  · Dancing.  · Water aerobics.  Vigorous-intensity exercise ideas include:  · Walking 4.5 miles (7.2 km) or more in about 1 hour.  · Jogging or running 5 miles (8 km) in about 1 hour.  · Biking 10 miles (16.1 km) or more in about 1 hour.  · Lap swimming.  · Roller-skating or in-line skating.  · Cross-country skiing.  · Vigorous competitive sports, such as football, basketball, and soccer.  · Jumping rope.  · Aerobic dancing.  What are some everyday activities that can help me to get exercise?  · Yard work, such as:  ? Pushing a .  ? Raking and bagging leaves.  · Washing your car.  · Pushing a stroller.  · Shoveling snow.  · Gardening.  · Washing windows or floors.  How can I be more active in my day-to-day activities?  · Use stairs instead of an  elevator.  · Take a walk during your lunch break.  · If you drive, park your car farther away from your work or school.  · If you take public transportation, get off one stop early and walk the rest of the way.  · Stand up or walk around during all of your indoor phone calls.  · Get up, stretch, and walk around every 30 minutes throughout the day.  · Enjoy exercise with a friend. Support to continue exercising will help you keep a regular routine of activity.  What guidelines can I follow while exercising?  · Before you start a new exercise program, talk with your health care provider.  · Do not exercise so much that you hurt yourself, feel dizzy, or get very short of breath.  · Wear comfortable clothes and wear shoes with good support.  · Drink plenty of water while you exercise to prevent dehydration or heat stroke.  · Work out until your breathing and your heartbeat get faster.  Where to find more information  · U.[place] Department of Health and Human Services: www.hhs.gov  · Centers for Disease Control and Prevention (CDC): www.cdc.gov  Summary  · Exercising regularly is important. It will improve your overall fitness, flexibility, and endurance.  · Regular exercise also will improve your overall health. It can help you control your weight, reduce stress, and improve your bone density.  · Do not exercise so much that you hurt yourself, feel dizzy, or get very short of breath.  · Before you start a new exercise program, talk with your health care provider.  This information is not intended to replace advice given to you by your health care provider. Make sure you discuss any questions you have with your health care provider.  Document Revised: 11/30/2018 Document Reviewed: 11/08/2018  [employer]vier Patient Education © 2021 [employer]  Calorie Counting for Weight Loss  Calories are units of energy. Your body needs a certain number of calories from food to keep going throughout the day. When you eat or drink more  calories than your body needs, your body stores the extra calories mostly as fat. When you eat or drink fewer calories than your body needs, your body burns fat to get the energy it needs.  Calorie counting means keeping track of how many calories you eat and drink each day. Calorie counting can be helpful if you need to lose weight. If you eat fewer calories than your body needs, you should lose weight. Ask your health care provider what a healthy weight is for you.  For calorie counting to work, you will need to eat the right number of calories each day to lose a healthy amount of weight per week. A dietitian can help you figure out how many calories you need in a day and will suggest ways to reach your calorie goal.  · A healthy amount of weight to lose each week is usually 1-2 lb (0.5-0.9 kg). This usually means that your daily calorie intake should be reduced by 500-750 calories.  · Eating 1,200-1,500 calories a day can help most women lose weight.  · Eating 1,500-1,800 calories a day can help most men lose weight.  What do I need to know about calorie counting?  Work with your health care provider or dietitian to determine how many calories you should get each day. To meet your daily calorie goal, you will need to:  · Find out how many calories are in each food that you would like to eat. Try to do this before you eat.  · Decide how much of the food you plan to eat.  · Keep a food log. Do this by writing down what you ate and how many calories it had.  To successfully lose weight, it is important to balance calorie counting with a healthy lifestyle that includes regular activity.  Where do I find calorie information?    The number of calories in a food can be found on a Nutrition Facts label. If a food does not have a Nutrition Facts label, try to look up the calories online or ask your dietitian for help.  Remember that calories are listed per serving. If you choose to have more than one serving of a food,  you will have to multiply the calories per serving by the number of servings you plan to eat. For example, the label on a package of bread might say that a serving size is 1 slice and that there are 90 calories in a serving. If you eat 1 slice, you will have eaten 90 calories. If you eat 2 slices, you will have eaten 180 calories.  How do I keep a food log?  After each time that you eat, record the following in your food log as soon as possible:  · What you ate. Be sure to include toppings, sauces, and other extras on the food.  · How much you ate. This can be measured in cups, ounces, or number of items.  · How many calories were in each food and drink.  · The total number of calories in the food you ate.  Keep your food log near you, such as in a pocket-sized notebook or on an zeus or website on your mobile phone. Some programs will calculate calories for you and show you how many calories you have left to meet your daily goal.  What are some portion-control tips?  · Know how many calories are in a serving. This will help you know how many servings you can have of a certain food.  · Use a measuring cup to measure serving sizes. You could also try weighing out portions on a kitchen scale. With time, you will be able to estimate serving sizes for some foods.  · Take time to put servings of different foods on your favorite plates or in your favorite bowls and cups so you know what a serving looks like.  · Try not to eat straight from a food's packaging, such as from a bag or box. Eating straight from the package makes it hard to see how much you are eating and can lead to overeating. Put the amount you would like to eat in a cup or on a plate to make sure you are eating the right portion.  · Use smaller plates, glasses, and bowls for smaller portions and to prevent overeating.  · Try not to multitask. For example, avoid watching TV or using your computer while eating. If it is time to eat, sit down at a table and  enjoy your food. This will help you recognize when you are full. It will also help you be more mindful of what and how much you are eating.  What are tips for following this plan?  Reading food labels  · Check the calorie count compared with the serving size. The serving size may be smaller than what you are used to eating.  · Check the source of the calories. Try to choose foods that are high in protein, fiber, and vitamins, and low in saturated fat, trans fat, and sodium.  Shopping  · Read nutrition labels while you shop. This will help you make healthy decisions about which foods to buy.  · Pay attention to nutrition labels for low-fat or fat-free foods. These foods sometimes have the same number of calories or more calories than the full-fat versions. They also often have added sugar, starch, or salt to make up for flavor that was removed with the fat.  · Make a grocery list of lower-calorie foods and stick to it.  Cooking  · Try to cook your favorite foods in a healthier way. For example, try baking instead of frying.  · Use low-fat dairy products.  Meal planning  · Use more fruits and vegetables. One-half of your plate should be fruits and vegetables.  · Include lean proteins, such as chicken, turkey, and fish.  Lifestyle  Each week, aim to do one of the following:  · 150 minutes of moderate exercise, such as walking.  · 75 minutes of vigorous exercise, such as running.  General information  · Know how many calories are in the foods you eat most often. This will help you calculate calorie counts faster.  · Find a way of tracking calories that works for you. Get creative. Try different apps or programs if writing down calories does not work for you.  What foods should I eat?    · Eat nutritious foods. It is better to have a nutritious, high-calorie food, such as an avocado, than a food with few nutrients, such as a bag of potato chips.  · Use your calories on foods and drinks that will fill you up and will not  leave you hungry soon after eating.  ? Examples of foods that fill you up are nuts and nut butters, vegetables, lean proteins, and high-fiber foods such as whole grains. High-fiber foods are foods with more than 5 g of fiber per serving.  · Pay attention to calories in drinks. Low-calorie drinks include water and unsweetened drinks.  The items listed above may not be a complete list of foods and beverages you can eat. Contact a dietitian for more information.  What foods should I limit?  Limit foods or drinks that are not good sources of vitamins, minerals, or protein or that are high in unhealthy fats. These include:  · Candy.  · Other sweets.  · Sodas, specialty coffee drinks, alcohol, and juice.  The items listed above may not be a complete list of foods and beverages you should avoid. Contact a dietitian for more information.  How do I count calories when eating out?  · Pay attention to portions. Often, portions are much larger when eating out. Try these tips to keep portions smaller:  ? Consider sharing a meal instead of getting your own.  ? If you get your own meal, eat only half of it. Before you start eating, ask for a container and put half of your meal into it.  ? When available, consider ordering smaller portions from the menu instead of full portions.  · Pay attention to your food and drink choices. Knowing the way food is cooked and what is included with the meal can help you eat fewer calories.  ? If calories are listed on the menu, choose the lower-calorie options.  ? Choose dishes that include vegetables, fruits, whole grains, low-fat dairy products, and lean proteins.  ? Choose items that are boiled, broiled, grilled, or steamed. Avoid items that are buttered, battered, fried, or served with cream sauce. Items labeled as crispy are usually fried, unless stated otherwise.  ? Choose water, low-fat milk, unsweetened iced tea, or other drinks without added sugar. If you want an alcoholic beverage,  choose a lower-calorie option, such as a glass of wine or light beer.  ? Ask for dressings, sauces, and syrups on the side. These are usually high in calories, so you should limit the amount you eat.  ? If you want a salad, choose a garden salad and ask for grilled meats. Avoid extra toppings such as zuleta, cheese, or fried items. Ask for the dressing on the side, or ask for olive oil and vinegar or lemon to use as dressing.  · Estimate how many servings of a food you are given. Knowing serving sizes will help you be aware of how much food you are eating at restaurants.  Where to find more information  · Centers for Disease Control and Prevention: www.cdc.gov  · U.S. Department of Agriculture: myplate.gov  Summary  · Calorie counting means keeping track of how many calories you eat and drink each day. If you eat fewer calories than your body needs, you should lose weight.  · A healthy amount of weight to lose per week is usually 1-2 lb (0.5-0.9 kg). This usually means reducing your daily calorie intake by 500-750 calories.  · The number of calories in a food can be found on a Nutrition Facts label. If a food does not have a Nutrition Facts label, try to look up the calories online or ask your dietitian for help.  · Use smaller plates, glasses, and bowls for smaller portions and to prevent overeating.  · Use your calories on foods and drinks that will fill you up and not leave you hungry shortly after a meal.  This information is not intended to replace advice given to you by your health care provider. Make sure you discuss any questions you have with your health care provider.  Document Revised: 01/28/2021 Document Reviewed: 01/28/2021  [employer]vier Patient Education © 2021 [employer]  BMI for Adults  What is BMI?  Body mass index (BMI) is a number that is calculated from a person's weight and height. BMI can help estimate how much of a person's weight is composed of fat. BMI does not measure body fat directly.  "Rather, it is an alternative to procedures that directly measure body fat, which can be difficult and expensive.  BMI can help identify people who may be at higher risk for certain medical problems.  What are BMI measurements used for?  BMI is used as a screening tool to identify possible weight problems. It helps determine whether a person is obese, overweight, a healthy weight, or underweight.  BMI is useful for:  · Identifying a weight problem that may be related to a medical condition or may increase the risk for medical problems.  · Promoting changes, such as changes in diet and exercise, to help reach a healthy weight. BMI screening can be repeated to see if these changes are working.  How is BMI calculated?  BMI involves measuring your weight in relation to your height. Both height and weight are measured, and the BMI is calculated from those numbers. This can be done either in English (U.S[place] or metric measurements. Note that charts and online BMI calculators are available to help you find your BMI quickly and easily without having to do these calculations yourself.  To calculate your BMI in English (U.S.) measurements:    1. Measure your weight in pounds (lb).  2. Multiply the number of pounds by 703.  ? For example, for a person who weighs 180 lb, multiply that number by 703, which equals 126,540.  3. Measure your height in inches. Then multiply that number by itself to get a measurement called \"inches squared.\"  ? For example, for a person who is 70 inches tall, the \"inches squared\" measurement is 70 inches x 70 inches, which equals 4,900 inches squared.  4. Divide the total from step 2 (number of lb x 703) by the total from step 3 (inches squared): 126,540 ÷ 4,900 = 25.8. This is your BMI.  To calculate your BMI in metric measurements:  1. Measure your weight in kilograms (kg).  2. Measure your height in meters (m). Then multiply that number by itself to get a measurement called \"meters squared.\"  ? For " "example, for a person who is 1.75 m tall, the \"meters squared\" measurement is 1.75 m x 1.75 m, which is equal to 3.1 meters squared.  3. Divide the number of kilograms (your weight) by the meters squared number. In this example: 70 ÷ 3.1 = 22.6. This is your BMI.  What do the results mean?  BMI charts are used to identify whether you are underweight, normal weight, overweight, or obese. The following guidelines will be used:  · Underweight: BMI less than 18.5.  · Normal weight: BMI between 18.5 and 24.9.  · Overweight: BMI between 25 and 29.9.  · Obese: BMI of 30 or above.  Keep these notes in mind:  · Weight includes both fat and muscle, so someone with a muscular build, such as an athlete, may have a BMI that is higher than 24.9. In cases like these, BMI is not an accurate measure of body fat.  · To determine if excess body fat is the cause of a BMI of 25 or higher, further assessments may need to be done by a health care provider.  · BMI is usually interpreted in the same way for men and women.  Where to find more information  For more information about BMI, including tools to quickly calculate your BMI, go to these websites:  · Centers for Disease Control and Prevention: www.cdc.gov  · American Heart Association: www.heart.org  · National Heart, Lung, and Blood [place]: www.nhlbi.nih.gov  Summary  · Body mass index (BMI) is a number that is calculated from a person's weight and height.  · BMI may help estimate how much of a person's weight is composed of fat. BMI can help identify those who may be at higher risk for certain medical problems.  · BMI can be measured using English measurements or metric measurements.  · BMI charts are used to identify whether you are underweight, normal weight, overweight, or obese.  This information is not intended to replace advice given to you by your health care provider. Make sure you discuss any questions you have with your health care provider.  Document Revised: " 09/09/2020 Document Reviewed: 07/17/2020  Elsevier Patient Education © 2021 [employer] Inc.

## 2021-08-20 NOTE — PROGRESS NOTES
Subjective   Emma Villafana is a 41 y.o. female.     Chief Complaint   Patient presents with   • Annual Exam   • Anxiety         In for annual preventive exam.  Sleep is actually lengthy.  Getting about 12 hours daily.  Exercises daily usually with walking the dog and walking with her  for an hour or 2.  Energy is down.  Diet is fairly balanced but only 1 meal per day.    Anxiety  Symptoms include nervous/anxious behavior and palpitations ( With panic attack). Patient reports no chest pain, confusion, dizziness, nausea or shortness of breath.            The following portions of the patient's history were reviewed and updated as appropriate: allergies, current medications, past social history and problem list.    Outpatient Medications Marked as Taking for the 8/20/21 encounter (Office Visit) with Kalpesh Fair MD   Medication Sig Dispense Refill   • Biotin 10 MG capsule Take 10 mg by mouth Daily.     • escitalopram (Lexapro) 10 MG tablet Take 1 tablet by mouth Daily. 90 tablet 1   • gabapentin (NEURONTIN) 100 MG capsule Take 1 capsule by mouth 3 (Three) Times a Day. 90 capsule 0   • ibuprofen (IBU) 200 MG tablet Take 200 mg by mouth Daily.     • loratadine (ALAVERT) 10 MG tablet Take 10 mg by mouth Daily.     • propranolol (INDERAL) 40 MG tablet TAKE 1 TABLET BY MOUTH 3 (THREE) TIMES A DAY AS NEEDED (ANXIETY). 90 tablet 0   • triamterene-hydrochlorothiazide (MAXZIDE) 75-50 MG per tablet Take 1 tablet by mouth Daily. 90 tablet 3       Review of Systems   Constitutional: Negative for appetite change, chills, diaphoresis, fatigue, fever and unexpected weight change.   Respiratory: Negative for cough, chest tightness, shortness of breath and wheezing.    Cardiovascular: Positive for palpitations ( With panic attack). Negative for chest pain and leg swelling.   Gastrointestinal: Negative for abdominal pain, anal bleeding, blood in stool, constipation, diarrhea, nausea and vomiting.   Genitourinary:  Negative for difficulty urinating, dysuria, flank pain, frequency, hematuria and urgency.   Musculoskeletal: Negative for arthralgias, back pain and myalgias.   Allergic/Immunologic: Positive for environmental allergies.   Neurological: Positive for headaches. Negative for dizziness, syncope, speech difficulty, weakness, light-headedness and numbness.   Hematological: Does not bruise/bleed easily.   Psychiatric/Behavioral: Positive for dysphoric mood. Negative for agitation, confusion and sleep disturbance. The patient is nervous/anxious.        Objective   Vitals:    08/20/21 0924   BP: 114/82   Pulse: 107   Resp: 16   Temp: 96.4 °F (35.8 °C)   SpO2: 98%      Wt Readings from Last 3 Encounters:   08/20/21 68.5 kg (151 lb)   04/18/19 76.7 kg (169 lb 3.2 oz)   10/19/18 75.8 kg (167 lb)    Body mass index is 23.64 kg/m².      Physical Exam  Vitals and nursing note reviewed.   Constitutional:       Appearance: Normal appearance. She is well-developed.   HENT:      Right Ear: External ear normal.      Left Ear: External ear normal.      Nose: Nose normal.   Eyes:      Conjunctiva/sclera: Conjunctivae normal.      Pupils: Pupils are equal, round, and reactive to light.   Neck:      Thyroid: No thyromegaly.      Vascular: No JVD.   Cardiovascular:      Rate and Rhythm: Normal rate and regular rhythm.      Heart sounds: Normal heart sounds. No murmur heard.   No gallop.    Pulmonary:      Effort: Pulmonary effort is normal. No respiratory distress.      Breath sounds: Normal breath sounds. No wheezing or rales.   Abdominal:      General: Bowel sounds are normal. There is no distension.      Palpations: Abdomen is soft. There is no mass.      Tenderness: There is no abdominal tenderness. There is no guarding.      Hernia: No hernia is present.   Musculoskeletal:         General: Normal range of motion.      Cervical back: Normal range of motion and neck supple.   Lymphadenopathy:      Cervical: No cervical adenopathy.    Skin:     General: Skin is warm and dry.   Neurological:      General: No focal deficit present.      Mental Status: She is alert and oriented to person, place, and time.      Cranial Nerves: No cranial nerve deficit.      Coordination: Coordination normal.      Deep Tendon Reflexes: Reflexes normal.   Psychiatric:         Mood and Affect: Mood normal.         Behavior: Behavior normal.         Thought Content: Thought content normal.         Judgment: Judgment normal.           Problems Addressed this Visit     None      Visit Diagnoses     Encounter for preventive health examination    -  Primary      Diagnoses       Codes Comments    Encounter for preventive health examination    -  Primary ICD-10-CM: Z00.00  ICD-9-CM: V70.0         Assessment/Plan   In for annual preventive exam.  She continues to be plagued with panic attacks.  Recent ER visit for that.  She is pretty fatigued and sleeping a lot on her current medical regimen.  That includes gabapentin 100 mg 3 times daily and Lexapro.  Just on gabapentin and Lexapro 2 weeks now.  We will give it at least 2 more weeks and then bump the Lexapro up to 20 mg daily if need be.  She can cut her gabapentin down to twice daily to see if she can cut down on her sleepiness and fatigue.  We will recheck in 1 month on anxiety.  We will recheck lab work today including CBC, CMP, lipids, TSH, hep C screen and UA.  Declines tetanus and Pneumovax today.  She HAs got a Pap smear scheduled.  She is fasting today.    Prevention counseling was performed today. The counseling performed was routine health maintenance topics.    The above information was reviewed again today 08/20/21.  It continues to be accurate as reflected above and is unchanged.  History, physical and review of systems all reviewed and are unchanged.  Medications were reviewed today and continue the current dosing.    PPE today includes face mask and eye shield.             Dragon disclaimer:   Much of this  encounter note is an electronic transcription/translation of spoken language to printed text. The electronic translation of spoken language may permit erroneous, or at times, nonsensical words or phrases to be inadvertently transcribed; Although I have reviewed the note for such errors, some may still exist.

## 2021-08-21 LAB
ALBUMIN SERPL-MCNC: 5.2 G/DL (ref 3.5–5.2)
ALBUMIN/GLOB SERPL: 1.9 G/DL
ALP SERPL-CCNC: 96 U/L (ref 39–117)
ALT SERPL-CCNC: 132 U/L (ref 1–33)
AST SERPL-CCNC: 199 U/L (ref 1–32)
BASOPHILS # BLD AUTO: 0.1 10*3/MM3 (ref 0–0.2)
BASOPHILS NFR BLD AUTO: 1.6 % (ref 0–1.5)
BILIRUB SERPL-MCNC: 0.4 MG/DL (ref 0–1.2)
BUN SERPL-MCNC: 19 MG/DL (ref 6–20)
BUN/CREAT SERPL: 20.4 (ref 7–25)
CALCIUM SERPL-MCNC: 10.2 MG/DL (ref 8.6–10.5)
CHLORIDE SERPL-SCNC: 90 MMOL/L (ref 98–107)
CHOLEST SERPL-MCNC: 390 MG/DL (ref 0–200)
CHOLEST/HDLC SERPL: 2.91 {RATIO}
CO2 SERPL-SCNC: 27.3 MMOL/L (ref 22–29)
CREAT SERPL-MCNC: 0.93 MG/DL (ref 0.57–1)
EOSINOPHIL # BLD AUTO: 0.19 10*3/MM3 (ref 0–0.4)
EOSINOPHIL NFR BLD AUTO: 3 % (ref 0.3–6.2)
ERYTHROCYTE [DISTWIDTH] IN BLOOD BY AUTOMATED COUNT: 15.5 % (ref 12.3–15.4)
GLOBULIN SER CALC-MCNC: 2.8 GM/DL
GLUCOSE SERPL-MCNC: 73 MG/DL (ref 65–99)
HCT VFR BLD AUTO: 37.4 % (ref 34–46.6)
HCV AB S/CO SERPL IA: <0.1 S/CO RATIO (ref 0–0.9)
HDLC SERPL-MCNC: 134 MG/DL (ref 40–60)
HGB BLD-MCNC: 12.5 G/DL (ref 12–15.9)
IMM GRANULOCYTES # BLD AUTO: 0.02 10*3/MM3 (ref 0–0.05)
IMM GRANULOCYTES NFR BLD AUTO: 0.3 % (ref 0–0.5)
LDLC SERPL CALC-MCNC: 248 MG/DL (ref 0–100)
LYMPHOCYTES # BLD AUTO: 2.52 10*3/MM3 (ref 0.7–3.1)
LYMPHOCYTES NFR BLD AUTO: 39.1 % (ref 19.6–45.3)
MCH RBC QN AUTO: 33.1 PG (ref 26.6–33)
MCHC RBC AUTO-ENTMCNC: 33.4 G/DL (ref 31.5–35.7)
MCV RBC AUTO: 98.9 FL (ref 79–97)
MONOCYTES # BLD AUTO: 0.58 10*3/MM3 (ref 0.1–0.9)
MONOCYTES NFR BLD AUTO: 9 % (ref 5–12)
NEUTROPHILS # BLD AUTO: 3.03 10*3/MM3 (ref 1.7–7)
NEUTROPHILS NFR BLD AUTO: 47 % (ref 42.7–76)
NRBC BLD AUTO-RTO: 0 /100 WBC (ref 0–0.2)
PLATELET # BLD AUTO: 242 10*3/MM3 (ref 140–450)
POTASSIUM SERPL-SCNC: 3.2 MMOL/L (ref 3.5–5.2)
PROT SERPL-MCNC: 8 G/DL (ref 6–8.5)
RBC # BLD AUTO: 3.78 10*6/MM3 (ref 3.77–5.28)
SODIUM SERPL-SCNC: 138 MMOL/L (ref 136–145)
TRIGL SERPL-MCNC: 68 MG/DL (ref 0–150)
TSH SERPL DL<=0.005 MIU/L-ACNC: 3.22 UIU/ML (ref 0.27–4.2)
VLDLC SERPL CALC-MCNC: 8 MG/DL (ref 5–40)
WBC # BLD AUTO: 6.44 10*3/MM3 (ref 3.4–10.8)

## 2021-08-23 ENCOUNTER — TELEPHONE (OUTPATIENT)
Dept: INTERNAL MEDICINE | Facility: CLINIC | Age: 41
End: 2021-08-23

## 2021-08-23 RX ORDER — POTASSIUM CHLORIDE 750 MG/1
20 CAPSULE, EXTENDED RELEASE ORAL DAILY
Qty: 180 CAPSULE | Refills: 1 | Status: SHIPPED | OUTPATIENT
Start: 2021-08-23 | End: 2021-09-08 | Stop reason: DRUGHIGH

## 2021-08-23 NOTE — TELEPHONE ENCOUNTER
Caller: Emma Villafana    Relationship: Self    Best call back number: [phone] (OK TO LEAVE VM)     What is the best time to reach you: ANY    Who are you requesting to speak with (clinical staff, provider, specific staff member): CLINICAL     What was the call regarding: PATIENT STATED THAT DR. CRAWFORD RECENTLY PRESCRIBED LEXAPRO AND IT IS MAKING HER VERY SLEEPY. SHE WANTS TO KNOW IF IT IS OK TO TAKE IT AT NIGHT INSTEAD OF IN THE MORNING.      Do you require a callback: YES

## 2021-08-24 ENCOUNTER — TELEPHONE (OUTPATIENT)
Dept: INTERNAL MEDICINE | Facility: CLINIC | Age: 41
End: 2021-08-24

## 2021-08-24 NOTE — TELEPHONE ENCOUNTER
Caller: Emma Villafana    Relationship: Self    Best call back number:[phone]    What form or medical record are you requesting: SHORT TERM DISABILITY    Who is requesting this form or medical record from you: EMPLOYER    How would you like to receive the form or medical records (pick-up, mail, fax): FAX BACK IF NUMBER IS PROVIDED ON FORM. IF NOT, PATIENT WILL  ONCE CALLED TO DO SO.     Timeframe paperwork needed: PATIENT NEEDS COMPLETED BEFORE 9/8/2021    Additional notes: PATIENT STATED HER EMPLOYER IS FAXING OVER SHORT TERM DISABILITY PAPERWORK. PLEASE FILL OUT AND FAX BACK OR CALL PATIENT TO . LOV 8/20/21.

## 2021-08-26 NOTE — TELEPHONE ENCOUNTER
LM to return call.   Need diagnosis?   When did she miss work?  How often does she think she will have to miss work due to condition?

## 2021-08-31 ENCOUNTER — TELEPHONE (OUTPATIENT)
Dept: INTERNAL MEDICINE | Facility: CLINIC | Age: 41
End: 2021-08-31

## 2021-08-31 NOTE — TELEPHONE ENCOUNTER
PT IS REQUESTING A CALL BACK TO SEE WHY THE POTASSIUM WAS PRESCRIBED, AS WELL AS UPDATES ON HER SHORT TERM DISABILITY.

## 2021-08-31 NOTE — TELEPHONE ENCOUNTER
Pt is taking triamterene & pharmacist informed her that potassium & triamterene can have interactions. Please advise if ok to proceed with potassium.

## 2021-08-31 NOTE — TELEPHONE ENCOUNTER
Her water pill is a combination of triamterene and hydrochlorothiazide.  Triamterene raises potassium and hydrochlorothiazide lowers potassium.  The thought is that the combination would keep potassium imbalance.  Unfortunately some patients still run low on potassium which is the case with her.  Her last potassium was low and hence we added some potassium.  She should take it.

## 2021-09-02 ENCOUNTER — APPOINTMENT (OUTPATIENT)
Dept: WOMENS IMAGING | Facility: HOSPITAL | Age: 41
End: 2021-09-02

## 2021-09-02 PROCEDURE — 77067 SCR MAMMO BI INCL CAD: CPT | Performed by: RADIOLOGY

## 2021-09-02 PROCEDURE — 77063 BREAST TOMOSYNTHESIS BI: CPT | Performed by: RADIOLOGY

## 2021-09-08 ENCOUNTER — TELEPHONE (OUTPATIENT)
Dept: INTERNAL MEDICINE | Facility: CLINIC | Age: 41
End: 2021-09-08

## 2021-09-08 DIAGNOSIS — I10 ESSENTIAL HYPERTENSION: Primary | ICD-10-CM

## 2021-09-08 DIAGNOSIS — E87.6 LOW SERUM POTASSIUM: ICD-10-CM

## 2021-09-08 DIAGNOSIS — F41.9 ANXIETY: ICD-10-CM

## 2021-09-08 RX ORDER — POTASSIUM CHLORIDE 750 MG/1
10 CAPSULE, EXTENDED RELEASE ORAL DAILY
COMMUNITY

## 2021-09-08 RX ORDER — POTASSIUM CHLORIDE 750 MG/1
10 TABLET, FILM COATED, EXTENDED RELEASE ORAL DAILY
COMMUNITY
End: 2021-09-08 | Stop reason: SDUPTHER

## 2021-09-08 RX ORDER — GABAPENTIN 100 MG/1
100 CAPSULE ORAL 3 TIMES DAILY
Qty: 90 CAPSULE | Refills: 0 | Status: SHIPPED | OUTPATIENT
Start: 2021-09-08 | End: 2021-10-18

## 2021-09-08 NOTE — TELEPHONE ENCOUNTER
Sometimes low potassium can imply too much salt in the diet.  Hence a low-salt diet might help.  Bananas, oranges, and potatoes all have lots of potassium in them.  She can increase those food groups.  Perhaps 1 banana or orange a day.  If none of this helps, we could switch her diuretic to one that simply raise his potassium rather than the balanced diuretic she is taking.  Let me know if she would like me to switch diuretics.  That should solve the problem.  That would be Aldactone 50 mg once daily #30x2 to start.  We would then recheck a potassium level in 1 month.

## 2021-09-08 NOTE — TELEPHONE ENCOUNTER
Caller: Emma Villafana    Relationship: Self    Best call back number: [phone]    Medication needed:   Requested Prescriptions     Pending Prescriptions Disp Refills   • gabapentin (NEURONTIN) 100 MG capsule 90 capsule 0     Sig: Take 1 capsule by mouth 3 (Three) Times a Day.       When do you need the refill by: ASAP    What additional details did the patient provide when requesting the medication:     Does the patient have less than a 3 day supply:  [x] Yes  [] No    What is the patient's preferred pharmacy: [id number] - [place], [place] - [address] AT [address] [phone] [facility] [phone]

## 2021-09-08 NOTE — TELEPHONE ENCOUNTER
Caller: Emma Villafana    Relationship to patient: Self    Best call back number: [phone]    Patient is needing: PATIENT WOULD LIKE TO KNOW WHAT ALTERNATIVES SHE CAN DO INSTEAD OF TAKING THE POTASSIUM, IS THERE CERTAIN FOODS THAT SHE COULD EAT? IT CAUSES HER TO HAVE DIARRHEA

## 2021-09-08 NOTE — TELEPHONE ENCOUNTER
Pt informed, she does not mind eating an orange daily, but could she possibly cut potassium to once daily instead of 2 tabs. Pt states that she can tolerate one tablet ok. Please advise.

## 2021-09-17 ENCOUNTER — TELEPHONE (OUTPATIENT)
Dept: INTERNAL MEDICINE | Facility: CLINIC | Age: 41
End: 2021-09-17

## 2021-09-17 ENCOUNTER — TELEMEDICINE (OUTPATIENT)
Dept: INTERNAL MEDICINE | Facility: CLINIC | Age: 41
End: 2021-09-17

## 2021-09-17 VITALS — SYSTOLIC BLOOD PRESSURE: 110 MMHG | DIASTOLIC BLOOD PRESSURE: 70 MMHG

## 2021-09-17 DIAGNOSIS — F41.9 ANXIETY: Primary | ICD-10-CM

## 2021-09-17 DIAGNOSIS — F41.9 ANXIETY: Primary | Chronic | ICD-10-CM

## 2021-09-17 DIAGNOSIS — F34.1 DYSTHYMIA: ICD-10-CM

## 2021-09-17 PROCEDURE — 99213 OFFICE O/P EST LOW 20 MIN: CPT | Performed by: INTERNAL MEDICINE

## 2021-09-17 RX ORDER — ESCITALOPRAM OXALATE 20 MG/1
20 TABLET ORAL DAILY
Qty: 90 TABLET | Refills: 1 | Status: SHIPPED | OUTPATIENT
Start: 2021-09-17 | End: 2021-10-21 | Stop reason: DRUGHIGH

## 2021-09-17 NOTE — TELEPHONE ENCOUNTER
PATIENT CALLED TO REQUEST A NEW RTW LETTER UNTIL Monday 10/18. PLEASE POST ON HER [employer] ACCOUNT

## 2021-09-17 NOTE — PROGRESS NOTES
Subjective   Emma Villafana is a 41 y.o. female.     Chief Complaint   Patient presents with   • Anxiety         Anxiety  Presents for follow-up visit. Symptoms include nervous/anxious behavior and panic. Patient reports no depressed mood. Symptoms occur occasionally. The severity of symptoms is mild and causing significant distress.            The following portions of the patient's history were reviewed and updated as appropriate: allergies, current medications, past social history and problem list.    Outpatient Medications Marked as Taking for the 9/17/21 encounter (Telemedicine) with Kalpesh Fair MD   Medication Sig Dispense Refill   • Biotin 10 MG capsule Take 10 mg by mouth Daily.     • escitalopram (Lexapro) 10 MG tablet Take 1 tablet by mouth Daily. 90 tablet 1   • gabapentin (NEURONTIN) 100 MG capsule Take 1 capsule by mouth 3 (Three) Times a Day. 90 capsule 0   • ibuprofen (IBU) 200 MG tablet Take 200 mg by mouth Daily.     • loratadine (ALAVERT) 10 MG tablet Take 10 mg by mouth Daily.     • potassium chloride (MICRO-K) 10 MEQ CR capsule Take 10 mEq by mouth Daily.     • propranolol (INDERAL) 40 MG tablet TAKE 1 TABLET BY MOUTH 3 (THREE) TIMES A DAY AS NEEDED (ANXIETY). 90 tablet 0   • triamterene-hydrochlorothiazide (MAXZIDE) 75-50 MG per tablet Take 1 tablet by mouth Daily. 90 tablet 3       Review of Systems   Musculoskeletal: Arthralgias: left ankle.   Psychiatric/Behavioral: Positive for dysphoric mood. The patient is nervous/anxious.        Objective   There were no vitals filed for this visit.   There were no vitals filed for this visit. [unfilled]  There is no height or weight on file to calculate BMI.      Physical Exam   Constitutional: She appears well-developed.   Pulmonary/Chest: Effort normal.   Abdominal: Normal appearance.   Neurological: She is alert.   Psychiatric: Her behavior is normal. Mood, judgment and thought content normal.          Problems Addressed this Visit         Mental Health    Anxiety - Primary (Chronic)    Dysthymia (Chronic)      Diagnoses       Codes Comments    Anxiety    -  Primary ICD-10-CM: F41.9  ICD-9-CM: 300.00     Dysthymia     ICD-10-CM: F34.1  ICD-9-CM: 300.4         Assessment/Plan   Video visit today due to Covid pandemic.  She is in for recheck on anxiety.  She's been off of tobacco since January 2019.  Doing very well with her tobacco cessation.   She still has periodic and intermittent anxiety.  Recent panic attacks.  Inderal not holding her well enough.  She is in a lot of distress at the present time with her anxiety.  We will begin on gabapentin 100 mg 3 times daily and lexapro 10 mg daily.  She has been off work but work is very stressful and she is having panic of her going back to work in 3 days.  Needs more time off.  Looking at a different job now.  We will have her off work until her next visit to see if we can get her anxiety under control.  Trying to get back in with her counselor.  She has had increasing stress lately over an abnormal Pap smear and abnormal mammogram that are having additional evaluation coming up.  Recheck in 4 weeks.  She also needs to get back in with her counselor.  Would like a new one.  Spent 18 minutes with patient on visit today.     The above information was reviewed again today 09/17/21.  It continues to be accurate as reflected above and is unchanged.  History, physical and review of systems all reviewed and are unchanged.  Medications were reviewed today and continue the current dosing.         Dragon disclaimer:   Much of this encounter note is an electronic transcription/translation of spoken language to printed text. The electronic translation of spoken language may permit erroneous, or at times, nonsensical words or phrases to be inadvertently transcribed; Although I have reviewed the note for such errors, some may still exist.

## 2021-09-30 NOTE — TELEPHONE ENCOUNTER
Pt requesting new return to work be 10/18/21. Please advise.      Last visit 9/17/21  No New visit  Scheduled with behavioral health for 10/8/21    *received FMLA forms, need to fill out with extended return to work date 10/18/21.

## 2021-10-05 ENCOUNTER — APPOINTMENT (OUTPATIENT)
Dept: WOMENS IMAGING | Facility: HOSPITAL | Age: 41
End: 2021-10-05

## 2021-10-05 PROCEDURE — 77065 DX MAMMO INCL CAD UNI: CPT | Performed by: RADIOLOGY

## 2021-10-05 PROCEDURE — 76642 ULTRASOUND BREAST LIMITED: CPT | Performed by: RADIOLOGY

## 2021-10-05 PROCEDURE — G0279 TOMOSYNTHESIS, MAMMO: HCPCS | Performed by: RADIOLOGY

## 2021-10-05 PROCEDURE — 77061 BREAST TOMOSYNTHESIS UNI: CPT | Performed by: RADIOLOGY

## 2021-10-13 RX ORDER — TRIAMTERENE AND HYDROCHLOROTHIAZIDE 75; 50 MG/1; MG/1
TABLET ORAL
Qty: 90 TABLET | Refills: 3 | Status: SHIPPED | OUTPATIENT
Start: 2021-10-13 | End: 2022-03-23 | Stop reason: SDUPTHER

## 2021-10-15 ENCOUNTER — TELEPHONE (OUTPATIENT)
Dept: INTERNAL MEDICINE | Facility: CLINIC | Age: 41
End: 2021-10-15

## 2021-10-15 NOTE — TELEPHONE ENCOUNTER
Patient is already seeing Kaylen Crowder LCSW on 10/19/21. Do you think maybe during her visit on Monday with you, she can get that letter, or should she just see Kaylen Crowder for work off? Please advise.

## 2021-10-15 NOTE — TELEPHONE ENCOUNTER
She missed her visit yesterday despite several attempts by us to set that up electronically.  Therefore I cannot write her a note.  There was no good excuse for her missing her visit yesterday.  If she is going to be off work any longer she will need to be seeing a specialist for her issues.

## 2021-10-15 NOTE — TELEPHONE ENCOUNTER
PATIENT CALLED STATING THAT SHE NEEDS DR. CRAWFORD TO WRITE HER A NOTE FOR WORK STATING THAT SHE WILL BE OFF FOR ANOTHER 4 WEEKS.    THE PATIENT IS DUE TO START WORKING AGAIN ON Monday 10/18/21 AND NEEDS THIS NOTE TODAY IF POSSIBLE.    PLEASE ADVISE  [phone]

## 2021-10-18 ENCOUNTER — OFFICE VISIT (OUTPATIENT)
Dept: INTERNAL MEDICINE | Facility: CLINIC | Age: 41
End: 2021-10-18

## 2021-10-18 VITALS
DIASTOLIC BLOOD PRESSURE: 70 MMHG | HEIGHT: 67 IN | RESPIRATION RATE: 18 BRPM | BODY MASS INDEX: 23.23 KG/M2 | HEART RATE: 95 BPM | SYSTOLIC BLOOD PRESSURE: 104 MMHG | WEIGHT: 148 LBS | TEMPERATURE: 97.6 F | OXYGEN SATURATION: 100 %

## 2021-10-18 DIAGNOSIS — F10.10 ALCOHOL ABUSE: ICD-10-CM

## 2021-10-18 DIAGNOSIS — F41.9 ANXIETY: ICD-10-CM

## 2021-10-18 DIAGNOSIS — F41.9 ANXIETY: Primary | Chronic | ICD-10-CM

## 2021-10-18 PROCEDURE — 99214 OFFICE O/P EST MOD 30 MIN: CPT | Performed by: INTERNAL MEDICINE

## 2021-10-18 RX ORDER — GABAPENTIN 100 MG/1
200 CAPSULE ORAL 3 TIMES DAILY
Qty: 180 CAPSULE | Refills: 0 | Status: SHIPPED | OUTPATIENT
Start: 2021-10-18 | End: 2021-10-22 | Stop reason: SDUPTHER

## 2021-10-18 NOTE — PROGRESS NOTES
Subjective   Emma Villafana is a 41 y.o. female.     Chief Complaint   Patient presents with   • Anxiety     Medication Management    • Dizziness   • Tinnitus   • Nasal Drainage   • Insomnia   • Hallucinations     Visual          Anxiety  Presents for follow-up visit. Symptoms include nervous/anxious behavior and panic. Patient reports no depressed mood. Symptoms occur occasionally. The severity of symptoms is mild and causing significant distress.            The following portions of the patient's history were reviewed and updated as appropriate: allergies, current medications, past social history and problem list.    Outpatient Medications Marked as Taking for the 10/18/21 encounter (Office Visit) with Kalpesh Fair MD   Medication Sig Dispense Refill   • Biotin 10 MG capsule Take 10 mg by mouth Daily.     • escitalopram (Lexapro) 20 MG tablet Take 1 tablet by mouth Daily. 90 tablet 1   • gabapentin (NEURONTIN) 100 MG capsule Take 1 capsule by mouth 3 (Three) Times a Day. 90 capsule 0   • ibuprofen (IBU) 200 MG tablet Take 200 mg by mouth Daily.     • loratadine (ALAVERT) 10 MG tablet Take 10 mg by mouth Daily.     • potassium chloride (MICRO-K) 10 MEQ CR capsule Take 10 mEq by mouth Daily.     • propranolol (INDERAL) 40 MG tablet TAKE 1 TABLET BY MOUTH 3 (THREE) TIMES A DAY AS NEEDED (ANXIETY). 90 tablet 0   • triamterene-hydrochlorothiazide (MAXZIDE) 75-50 MG per tablet TAKE 1 TABLET BY MOUTH EVERY DAY 90 tablet 3       Review of Systems   Musculoskeletal: Arthralgias: left ankle.   Psychiatric/Behavioral: Positive for dysphoric mood. The patient is nervous/anxious.        Objective   Vitals:    10/18/21 0840   BP: 104/70   Pulse: 95   Resp: 18   Temp: 97.6 °F (36.4 °C)   SpO2: 100%          10/18/21  0840   Weight: 67.1 kg (148 lb)    [unfilled]  Body mass index is 23.18 kg/m².      Physical Exam   Constitutional: She appears well-developed.   Pulmonary/Chest: Effort normal.   Abdominal: Normal  appearance.   Neurological: She is alert.   Psychiatric: Her behavior is normal. Mood, judgment and thought content normal.          Problems Addressed this Visit        Mental Health    Anxiety - Primary (Chronic)    Alcohol abuse      Diagnoses       Codes Comments    Anxiety    -  Primary ICD-10-CM: F41.9  ICD-9-CM: 300.00     Alcohol abuse     ICD-10-CM: F10.10  ICD-9-CM: 305.00         Assessment/Plan   Video visit today due to Covid pandemic.  She is in for recheck on anxiety.  She's been off of tobacco since January 2019.  Doing very well with her tobacco cessation.   History of chronic alcohol abuse.  Has been worse recently.  She is now sober for 5 days.   gave her an ultimatum to stop drinking.  She still has periodic and intermittent anxiety.  Recent panic attacks.  Inderal not holding her well enough.   On gabapentin 100 mg 3 times daily and lexapro 10 mg daily.  She has been off work but work is very stressful and she is having panic of her going back to work in 3 days.  Needs more time off.  Looking at a different job now.  She has had some intermittent imbalance over the last 5 weeks.  Tends to come and go pretty quickly.  Often in conjunction with a panic attack.  Ears clogged up at times.  Insomnia.  Periods of 24-36 hours without sleep.   Having some auditory and visual hallucinations over the past 2 to 3 weeks on a daily basis.  Mostly auditory.  We really need to get her into a formal psychiatrist for continued care.  Symptoms are somewhat suspicious for bipolar disorder.  We will cut Lexapro down to 10 mg/day.  She can take an extra 100 mg of gabapentin on a as needed basis for her panic attacks which seems to help quite a bit.  Suggested today that she go to [facility] for a psychiatric intake to see if she is a candidate for acute inpatient care for the current crisis.  Minimally I think she needs an overall of her medications given the issues of bipolar disease as well as the  current hallucinations.    The above information was reviewed again today 10/18/21.  It continues to be accurate as reflected above and is unchanged.  History, physical and review of systems all reviewed and are unchanged.  Medications were reviewed today and continue the current dosing.    PPE today includes face mask and eye shield.         Dragon disclaimer:   Much of this encounter note is an electronic transcription/translation of spoken language to printed text. The electronic translation of spoken language may permit erroneous, or at times, nonsensical words or phrases to be inadvertently transcribed; Although I have reviewed the note for such errors, some may still exist.

## 2021-10-19 ENCOUNTER — TELEMEDICINE (OUTPATIENT)
Dept: BEHAVIORAL HEALTH | Facility: CLINIC | Age: 41
End: 2021-10-19

## 2021-10-19 DIAGNOSIS — F41.1 GENERALIZED ANXIETY DISORDER: Primary | ICD-10-CM

## 2021-10-19 PROCEDURE — 90791 PSYCH DIAGNOSTIC EVALUATION: CPT

## 2021-10-19 NOTE — PROGRESS NOTES
Subjective   Patient ID: Emma Villafana is a 41 y.o. female is being seen for consultation today at the request of patient's primary care physician.    Chief Complaint: Patient reports symptoms of anxiety.  Patient reports history of experiencing a panic attack at work.  She also reports last Friday experiencing auditory and visual hallucinations.  Patient reports discussing bipolar diagnoses with her PCP.  She also expressed that she used to drink excessively and recently stopped last Friday.  Patient reports having no emotional stability.  Patient also expressed experiencing a lot of stress due to her work environment.  Patient reports due to this she has not worked for about a month.    History of Present Illness    The following portions of the patient's history were reviewed and updated as appropriate: allergies, current medications, past family history, past medical history, past social history and past surgical history.    Past Psych History: Patient reports reports experiencing panic attack at work in August 2021.  Patient reports she also had a panic attack in January 2016 in July 2017.  Patient also expressed overall anxious feelings.    Substance Use History: Patient reports his substance use history from last Friday. patient reports drinking daily about 6 bottles of airline/travel size liquor bottles and a pint of bourbon.  Patient reports she stopped drinking last Friday when she experienced hallucinations.    Medical History: Patient did not discuss any other medical history aside from anxiety depression and excessively drinking alcohol.  Past Medical History:   Diagnosis Date   • Anxiety         History reviewed. No pertinent surgical history.    Medications:   Current Outpatient Medications:   •  Biotin 10 MG capsule, Take 10 mg by mouth Daily., Disp: , Rfl:   •  escitalopram (Lexapro) 20 MG tablet, Take 1 tablet by mouth Daily., Disp: 90 tablet, Rfl: 1  •  gabapentin (NEURONTIN) 100 MG capsule, Take  2 capsules by mouth 3 (Three) Times a Day., Disp: 180 capsule, Rfl: 0  •  ibuprofen (IBU) 200 MG tablet, Take 200 mg by mouth Daily., Disp: , Rfl:   •  loratadine (ALAVERT) 10 MG tablet, Take 10 mg by mouth Daily., Disp: , Rfl:   •  potassium chloride (MICRO-K) 10 MEQ CR capsule, Take 10 mEq by mouth Daily., Disp: , Rfl:   •  propranolol (INDERAL) 40 MG tablet, TAKE 1 TABLET BY MOUTH 3 (THREE) TIMES A DAY AS NEEDED (ANXIETY)., Disp: 90 tablet, Rfl: 0  •  triamterene-hydrochlorothiazide (MAXZIDE) 75-50 MG per tablet, TAKE 1 TABLET BY MOUTH EVERY DAY, Disp: 90 tablet, Rfl: 3    Allergies   Allergen Reactions   • Penicillins Unknown - Low Severity       Review of Systems    Family History: Patient reports having a great aunt with depression, another great aunt with depression and alcoholism and a cousin who is an alcoholic.  Patient unable to recall any other family history.  Family History   Problem Relation Age of Onset   • Arthritis Mother    • Lung cancer Father    • No Known Problems Brother        Social History: Patient reports she has had not worked for about a month due to her level of anxiety.  Patient reports she has a younger brother who lives in [place] and they are polar opposites and personality.  Patient reports they do not talk often.  Patient reports she has been  to her  for 2 years today.  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  However, she does not enjoy the stress from work with the micromanage treatment she experiences.  Patient reports her dad is  and she has a good relationship with her mother.  Patient disclosed having close friends however sometimes it is hard due to all her friends having children and her not having children.    Objective   Mental Status Exam  Hygiene:  good  Dress:  casual  Attitude:  Cooperative  Motor Activity:  Appropriate  Speech:  Normal  Mood:  depressed and labile  Affect:  flat  Thought Processes:  Goal  directed  Thought Content:  normal  Suicidal Thoughts:  denies  Homicidal Thoughts:  denies  Crisis Safety Plan: yes, to come to the emergency room.  Hallucinations:  admits to experiencing hallucinations last Friday auditory and visual.  Patient reports no other hallucination experiences.      Strengths: Her strengths are she is very caring, funny/goofy, and a helper of people    Weaknesses: Patient reports her weaknesses are bottling her emotions, having a short fuse to anger, poor communication with her brother    Problems include but are not limited to: Anxiety, depression, vocation stress and unresolved grief or loss      Short term goals: Provide safe, confidential environment to facilitate the development of positive therapeutic relationship and encourage open, honest communication. Patient will maintain stability and avoid higher level of care.     Long term goals: The patient will have complete cessation of symptoms and be able to function at optimal levels without the need for continued treatment.      Assessment/Plan   Diagnoses and all orders for this visit:    1. Generalized anxiety disorder (Primary)        Plan:  Return in 1 week (on 10/26/2021), or 1-2 depending on schedule availability, for Next scheduled follow up.

## 2021-10-20 ENCOUNTER — TELEPHONE (OUTPATIENT)
Dept: INTERNAL MEDICINE | Facility: CLINIC | Age: 41
End: 2021-10-20

## 2021-10-20 NOTE — TELEPHONE ENCOUNTER
Called patient to informed patient, will speak to Alexandra tomorrow morning in regards to disability forms. Patient appreciated the call

## 2021-10-20 NOTE — TELEPHONE ENCOUNTER
Caller: Emma Villafana    Relationship: Self    Best call back number: [phone]     What is the best time to reach you: ASAP  Who are you requesting to speak with (clinical staff, provider,  specific staff member): CLINICAL    Do you know the name of the person who called: SRIRAM    What was the call regarding: REGARDING HER SHORT TERM DISABILITY AND WANTS TO KNOW WHAT THE STATUS IS ON THAT AND IF HE HAS TALKED TO HER THERAPIST. ALSO SHE PICKED UP GABAPENTIN  AND THEY ONLY FILLED IT  MG 3 TIMES A DAY. SHE HAS BEEN TAKING 200 MG THREE A DAY OUT OF THAT REFILL. SHE WILL NEED MORE FILLED IN ORDER TO STAY  MG 3 TIMES A DAY.     [facility] #[id number] - [place], [place] - [address] [place] [phone] [facility] [phone] FX     Do you require a callback: YES

## 2021-10-21 DIAGNOSIS — F41.9 ANXIETY: ICD-10-CM

## 2021-10-21 RX ORDER — ESCITALOPRAM OXALATE 10 MG/1
10 TABLET ORAL DAILY
COMMUNITY
End: 2022-02-07

## 2021-10-21 NOTE — TELEPHONE ENCOUNTER
Yes.  I did talk with her therapist.  She was going to talk with her , however I believe her therapist will be able to handle the work notes.  Ask her what happened when she left my office ER the other day and went over to [facility] for an evaluation.  I was hopeful she would be admitted.

## 2021-10-21 NOTE — TELEPHONE ENCOUNTER
LM to return call.  Per Dr. Fair, patient is to see his therapist, & have her complete & determine return to work. So she needs to have her employer fax STD paperwork to her therapist & complete with date to return to work.

## 2021-10-21 NOTE — TELEPHONE ENCOUNTER
Patient reports taking Gabapentin 100 mg 2 capsules three times daily. Per last OV 10/18/21 she is taking 100 mg tid. Please advise correct dose.    Also, corrected lexapro 10mg, since med list is 20mg daily.

## 2021-10-21 NOTE — TELEPHONE ENCOUNTER
Patient did not go to [facility], she got confused & attempted to get an appt instead. I've advised to go in & request an evaluation, that way they can determine if she needs admitted or just OP help. Pt verbalized understanding & will reach out to her mom to take her today or tomorrow.    **I've check with [facility], today they do not have any more available beds. Patient's can get evaluated, but would have to wait in the lobby for a bed. They informed that if we have patients, we should refer to ER. They are not recommending this to patients, they are giving them info on other resources.    Patient will get paperwork to therapist's manager.

## 2021-10-21 NOTE — TELEPHONE ENCOUNTER
Patient says her therapist was supposed to send you an internal message on Tuesday regarding her return to work. Patient following up to see if you had spoken to her therapist. Please advise.

## 2021-10-21 NOTE — TELEPHONE ENCOUNTER
Called Sheeba Granado with intake stated beds are available. Called spoke with patient, stated will check with  to see when he will be home. Instructed patient to keep us informed.

## 2021-10-22 RX ORDER — GABAPENTIN 100 MG/1
200 CAPSULE ORAL 3 TIMES DAILY
Qty: 540 CAPSULE | Refills: 0 | Status: SHIPPED | OUTPATIENT
Start: 2021-10-22 | End: 2022-01-31

## 2021-10-22 NOTE — TELEPHONE ENCOUNTER
It does not come as a 200 mg capsule.  Only 100 mg and 300 milligrams.  And she needs to take 2 of the one hundreds 3 times daily if that is what she has been doing.

## 2021-10-26 ENCOUNTER — TELEMEDICINE (OUTPATIENT)
Dept: BEHAVIORAL HEALTH | Facility: CLINIC | Age: 41
End: 2021-10-26

## 2021-10-26 DIAGNOSIS — F41.9 ANXIETY: Primary | Chronic | ICD-10-CM

## 2021-10-26 PROCEDURE — 90834 PSYTX W PT 45 MINUTES: CPT

## 2021-10-26 NOTE — PATIENT INSTRUCTIONS
Patient needs to make sure proper paperwork was faxed and received at office for Kaylen Crowder. Patient also needs to make an appointment with FRANCES Salas TRACEY for initial evaluation for medication management.

## 2021-10-26 NOTE — PROGRESS NOTES
Access to MH/SA Psychotherapy Notes:  A licensed health care professional has determined, in the exercise of professional judgment, that the access requested is reasonably likely to endanger the life or physical safety of the individual or another person.  Date: October 26, 2021  Time In: 8:00am  Time Out: 8:46am  This provider is located at [facility]) [address] [place] [address] using a secure [employer] Video Visit through [employer]. Patient is being seen remotely via telehealth at home address in [place] and stated they are in a secure environment for this session. The patient's condition being diagnosed/treated is appropriate for telemedicine. The provider identified herself as well as her credentials. The patient, and/or patients guardian, consent to be seen remotely, and when consent is given they understand that the consent allows for patient identifiable information to be sent to a third party as needed. They may refuse to be seen remotely at any time. The electronic data is encrypted and password protected, and the patient and/or guardian has been advised of the potential risks to privacy not withstanding such measures.     You have chosen to receive care through a telehealth visit.  Do you consent to use a video/audio connection for your medical care today? Yes    PROGRESS NOTE  Data:  Emma Villafana is a 41 y.o. female who presents today for follow up    Chief Complaint: Patient continues to experience symptoms of anxiety and depression. Patient expressed feeling manic at times as well. Patient shared she is not exactly sure what is wrong. However, reports not feeling like herself. Patient shared feeling like a bridge that was taking on so much weight that it eventually collapsed and she is the bridge.    History of Present Illness: Patient expressed always feeling anxious and struggling with depressive feelings. Patient  reports before her recent experience with hallucinating she had never experience hallucinations prior to that.      Clinical Maneuvering/Intervention:    Assisted patient in processing above session content; acknowledged and normalized patient’s thoughts, feelings, and concerns.  Rationalized patient thought process regarding anxiety and depressive symptoms.  Discussed triggers associated with patient's excessive worry.  Also discussed coping skills for patient to implement such as journaling, physical activity and mindfulness in regards to her emotional state.    Allowed patient to freely discuss issues without interruption or judgment. Provided safe, confidential environment to facilitate the development of positive therapeutic relationship and encourage open, honest communication. Assisted patient in identifying risk factors which would indicate the need for higher level of care including thoughts to harm self or others and/or self-harming behavior and encouraged patient to contact this office, call 911, or present to the nearest emergency room should any of these events occur. Discussed crisis intervention services and means to access. Patient adamantly and convincingly denies current suicidal or homicidal ideation or perceptual disturbance.    Assessment:   Assessment   Patient appears to maintain relative stability as compared to their baseline.  However, patient continues to struggle with emotional stability and motivation level which continues to cause impairment in important areas of functioning.  A result, they can be reasonably expected to continue to benefit from treatment and would likely be at increased risk for decompensation otherwise.    Mental Status Exam:   Hygiene:   good  Cooperation:  Cooperative  Eye Contact:  Good  Psychomotor Behavior:  Appropriate  Affect:  Appropriate  Mood: euthymic  Speech:  Normal  Thought Process:  Goal directed  Thought Content:  Normal  Suicidal:  None  Homicidal:   None  Hallucinations:  None, patient reports not experiencing any hallucinations since last wednesday  Delusion:  None  Memory:  Intact  Orientation:  Person, Place, Time and Situation  Reliability:  fair  Insight:  Fair  Judgement:  Fair  Impulse Control:  Fair  Physical/Medical Issues:  No        Patient's Support Network Includes:  , mother and friend group    Functional Status: Moderate impairment     Progress toward goal: Not at goal    Prognosis: Fair with Ongoing Treatment          Plan:    Patient will continue in individual outpatient therapy with focus on improved functioning and coping skills, maintaining stability, and avoiding decompensation and the need for higher level of care.    Patient will adhere to medication regimen as prescribed and report any side effects. Patient will contact this office, call 911 or present to the nearest emergency room should suicidal or homicidal ideations occur. Provide Cognitive Behavioral Therapy and Solution Focused Therapy to improve functioning, maintain stability, and avoid decompensation and the need for higher level of care.     Return in about 1-2 weeks, or earlier if symptoms worsen or fail to improve.           VISIT DIAGNOSIS:     ICD-10-CM ICD-9-CM   1. Anxiety  F41.9 300.00          [facility] No Show Policy:  We understand unexpected circumstances arise; however, anytime you miss your appointment we are unable to provide you appropriate care.  In addition, each appointment missed could have been used to provide care for others.  We ask that you call at least 24 hours in advance to cancel or reschedule an appointment.  We would like to take this opportunity to remind you of our policy stating patients who miss THREE or more appointments without cancelling or rescheduling 24 hours in advance of the appointment may be subject to cancellation of any further visits with our clinic and recommendation to seek in-person  services/visits.    Please call [phone] to reschedule your appointment. If there are reasons that make it difficult for you to keep the appointments, please call and let us know how we can help.  Please understand that medication prescribing will not continue without seeing your provider.      [facility]'s No Show Policy reviewed with patient at today's visit. Patient verbalized understanding of this policy. Discussed with patient that in the event that there are three or more no show visits, it will be recommended that they pursue in-person services/visits as noncompliance with telehealth visits indicates that patient is not an appropriate candidate for telemedicine and would likely be more appropriate for in-person services/visits. Patient verbalizes understanding and is agreeable to this.        This document signed by Kaylen Crowder LCSW electronically Kaylen Crowder LCSW  October 26, 2021 09:00 EDT      Part of this note may be an electronic transcription/translation of spoken language to printed text using the Dragon Dictation System.

## 2021-10-29 ENCOUNTER — OFFICE VISIT (OUTPATIENT)
Dept: BEHAVIORAL HEALTH | Facility: CLINIC | Age: 41
End: 2021-10-29

## 2021-10-29 VITALS
HEIGHT: 66 IN | WEIGHT: 159 LBS | BODY MASS INDEX: 25.55 KG/M2 | RESPIRATION RATE: 16 BRPM | DIASTOLIC BLOOD PRESSURE: 62 MMHG | OXYGEN SATURATION: 94 % | HEART RATE: 79 BPM | SYSTOLIC BLOOD PRESSURE: 102 MMHG

## 2021-10-29 DIAGNOSIS — F41.0 PANIC DISORDER: Primary | ICD-10-CM

## 2021-10-29 DIAGNOSIS — F10.24 ALCOHOL DEPENDENCE WITH ALCOHOL-INDUCED MOOD DISORDER (HCC): ICD-10-CM

## 2021-10-29 PROCEDURE — 90792 PSYCH DIAG EVAL W/MED SRVCS: CPT

## 2021-10-29 RX ORDER — HYDROXYZINE HYDROCHLORIDE 25 MG/1
25 TABLET, FILM COATED ORAL 2 TIMES DAILY PRN
Qty: 60 TABLET | Refills: 1 | Status: SHIPPED | OUTPATIENT
Start: 2021-10-29 | End: 2021-11-21

## 2021-10-29 RX ORDER — QUETIAPINE FUMARATE 50 MG/1
50 TABLET, FILM COATED ORAL NIGHTLY
Qty: 30 TABLET | Refills: 1 | Status: SHIPPED | OUTPATIENT
Start: 2021-10-29 | End: 2021-11-12 | Stop reason: SDUPTHER

## 2021-10-29 NOTE — PROGRESS NOTES
"      Subjective   Emma Villafana is a 41 y.o. female who presents today for initial evaluation     Chief Complaint:  \"Im here to control my ups and downs and for my anxiety\" per pt.    History of Present Illness:   Patient presents today with signs and symptoms of anxiety.  Patient reports panic attacks have increased in severity since August, last panic attack was 2 Saturdays ago.  Patient reports 1 panic attack resulted in her going to the emergency room and it taking several hours for her to calm down, panic attacks are work-related no other triggers are identified.  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  Patient reports that prior pharmacy job was more enjoyable but has no hope of going back there.  Patient currently on FMLA leave, and is working with Kaylen Crowder on extending her time.    Patient reports signs and symptoms of panic that include tightness in the chest, feeling short of breath, sweating, and shaking.    Patient reports signs and symptoms of depression that include low mood that occurs most every day, sleep difficulties, a decrease in interest in activities, low energy level, and weight loss due to decreased appetite, patient denies SI currently.  No history of SI or attempts reported.    Patient reports signs and symptoms that are highly likely of acute alcohol withdrawal that occurred around the same time period that she stopped drinking.  Patient reported around this time she had an increases in anxiety and panic as well as audio and visual hallucinations, patient reported hearing friends in different parts of her home while she was home alone.  Patient reports that  was with her and he thought she was acting unusual.    Patient reports signs and symptoms of potential krishna/hypomania, but during assessment the symptoms appear to occur only around the time she quit drinking and are likely a result of detox.  Mood related " symptoms include increased irritability, decreased need for sleep, talking excessively and having high increase in thoughts.    Patient reports drinking a pint of bourbon every day for over 10 years patient would only drink at home towards the end of the night, patient reports alcohol would help calm her down and help her sleep.  Patient reports being sober since the 15th of this month (2 weeks sober), patient asked how she is staying sober, patient responses I just am.  Patient educated on potential need for an outpatient program, and or AA meetings with a sponsor.  Patient reports contacting [facility] for an outpatient program, and they said there was a 6-month waiting list.    Patient educated on the likelihood of signs and symptoms of anxiety and panic that can last post withdrawal from alcohol and that her mood being unstable is very common after physical detoxes occurred.  Patient educated that it would take several months before she would feel 100% normal, but medications are available that can assist her.  Patient educated on the need for sustained abstinence from alcohol.    Patient denies use of nicotine, and reports quitting smoking 5 years ago, patient might have a few sips of an energy drink once or twice a week.    In conclusion the presence of anxiety, panic, and mood related symptoms are likely due to from cessation of alcohol in lieu of a true manic/hypomanic episode.  Regardless, patient is still struggling and would benefit from therapy, medication and lifestyle changes.    Patient currently taking Lexapro 10 mg, per patient dose was reduced from 20 mg due to patient feeling like a zombie, patient currently taking gabapentin 200 mg 3 times a day for anxiety, in addition patient has propranolol 40 mg up to 3 times a day as needed for anxiety that she reports she does not take very often due to it only working on occasion.  Patient has been taking an over the counter sleep aid that has  improved her sleep.    Seroquel an atypical antipsychotic was selected to augment the effects of her SSRI which is Lexapro, as well as aid in sleep, as well as assist in acute mood stabilization.  Patient educated on side effects of Seroquel including sedation, risk for injury/falls, and increases in appetite/hunger.  Patient requesting an as needed medication for acute anxiety Vistaril or hydroxyzine is selected, patient educated on potential side effects that include sedation, dry mouth, and dizziness,    The following portions of the patient's history were reviewed and updated as appropriate: allergies, current medications, past family history, past medical history, past social history, past surgical history and problem list.      Past Medical History:  Past Medical History:   Diagnosis Date   • Anxiety        Social History:  Social History     Socioeconomic History   • Marital status:    Tobacco Use   • Smoking status: Former Smoker     Packs/day: 1.00     Years: 19.00     Pack years: 19.00     Types: Cigarettes     Start date:      Quit date: 2017     Years since quittin.1   • Smokeless tobacco: Never Used   Substance and Sexual Activity   • Alcohol use: Yes     Alcohol/week: 0.0 - 2.0 standard drinks     Comment: 1-2 x month   • Drug use: No       Family History:  Family History   Problem Relation Age of Onset   • Arthritis Mother    • Lung cancer Father    • No Known Problems Brother        Past Surgical History:  No past surgical history on file.    Problem List:  Patient Active Problem List   Diagnosis   • Alcohol abuse   • Anxiety   • Essential hypertension   • Dysthymia        Allergy:   Allergies   Allergen Reactions   • Penicillins Unknown - Low Severity        Current Medications:   Current Outpatient Medications   Medication Sig Dispense Refill   • Biotin 10 MG capsule Take 10 mg by mouth Daily.     • escitalopram (LEXAPRO) 10 MG tablet Take 10 mg by mouth Daily.     • gabapentin  "(NEURONTIN) 100 MG capsule Take 2 capsules by mouth 3 (Three) Times a Day. 540 capsule 0   • ibuprofen (IBU) 200 MG tablet Take 200 mg by mouth Daily.     • loratadine (ALAVERT) 10 MG tablet Take 10 mg by mouth Daily.     • potassium chloride (MICRO-K) 10 MEQ CR capsule Take 10 mEq by mouth Daily.     • triamterene-hydrochlorothiazide (MAXZIDE) 75-50 MG per tablet TAKE 1 TABLET BY MOUTH EVERY DAY 90 tablet 3   • hydrOXYzine (ATARAX) 25 MG tablet Take 1 tablet by mouth 2 (Two) Times a Day As Needed for Itching. 60 tablet 1   • propranolol (INDERAL) 40 MG tablet TAKE 1 TABLET BY MOUTH 3 (THREE) TIMES A DAY AS NEEDED (ANXIETY). 90 tablet 0   • QUEtiapine (SEROquel) 50 MG tablet Take 1 tablet by mouth Every Night. 30 tablet 1     No current facility-administered medications for this visit.       Review of Symptoms:    Review of Systems   Constitutional: Negative.    Respiratory: Negative.    Cardiovascular: Negative.    Neurological: Negative.    Psychiatric/Behavioral: Positive for sleep disturbance and stress. The patient is nervous/anxious.          Physical Exam:   Blood pressure 102/62, pulse 79, resp. rate 16, height 167.6 cm (66\"), weight 72.1 kg (159 lb), SpO2 94 %.  Body mass index is 25.66 kg/m².  Contraception or pregnancy prevention method    Appearance: Hair/clothing are neat/clean and appropriate for age.  Gait, Station, Strength: No issues with ambulation, no assistive devices used.    Mental Status Exam:   Hygiene:   good  Cooperation:  Cooperative  Eye Contact:  Good  Psychomotor Behavior:  Appropriate  Affect:  Full range  Mood: sad and anxious  Hopelessness: 4  Speech:  Rapid  Thought Process:  Goal directed  Thought Content:  Normal  Suicidal:  None  Homicidal:  None  Hallucinations:  None  Delusion:  None  Memory:  Intact  Orientation:  Person, Place, Time and Situation  Reliability:  fair  Insight:  Fair  Judgement:  Fair  Impulse Control:  Fair  Physical/Medical Issues:  reviewed   Lab Results: "   No visits with results within 1 Month(s) from this visit.   Latest known visit with results is:   Office Visit on 08/20/2021   Component Date Value Ref Range Status   • WBC 08/20/2021 6.44  3.40 - 10.80 10*3/mm3 Final   • RBC 08/20/2021 3.78  3.77 - 5.28 10*6/mm3 Final   • Hemoglobin 08/20/2021 12.5  12.0 - 15.9 g/dL Final   • Hematocrit 08/20/2021 37.4  34.0 - 46.6 % Final   • MCV 08/20/2021 98.9* 79.0 - 97.0 fL Final   • MCH 08/20/2021 33.1* 26.6 - 33.0 pg Final   • MCHC 08/20/2021 33.4  31.5 - 35.7 g/dL Final   • RDW 08/20/2021 15.5* 12.3 - 15.4 % Final   • Platelets 08/20/2021 242  140 - 450 10*3/mm3 Final   • Neutrophil Rel % 08/20/2021 47.0  42.7 - 76.0 % Final   • Lymphocyte Rel % 08/20/2021 39.1  19.6 - 45.3 % Final   • Monocyte Rel % 08/20/2021 9.0  5.0 - 12.0 % Final   • Eosinophil Rel % 08/20/2021 3.0  0.3 - 6.2 % Final   • Basophil Rel % 08/20/2021 1.6* 0.0 - 1.5 % Final   • Neutrophils Absolute 08/20/2021 3.03  1.70 - 7.00 10*3/mm3 Final   • Lymphocytes Absolute 08/20/2021 2.52  0.70 - 3.10 10*3/mm3 Final   • Monocytes Absolute 08/20/2021 0.58  0.10 - 0.90 10*3/mm3 Final   • Eosinophils Absolute 08/20/2021 0.19  0.00 - 0.40 10*3/mm3 Final   • Basophils Absolute 08/20/2021 0.10  0.00 - 0.20 10*3/mm3 Final   • Immature Granulocyte Rel % 08/20/2021 0.3  0.0 - 0.5 % Final   • Immature Grans Absolute 08/20/2021 0.02  0.00 - 0.05 10*3/mm3 Final   • nRBC 08/20/2021 0.0  0.0 - 0.2 /100 WBC Final   • Glucose 08/20/2021 73  65 - 99 mg/dL Final   • BUN 08/20/2021 19  6 - 20 mg/dL Final   • Creatinine 08/20/2021 0.93  0.57 - 1.00 mg/dL Final   • eGFR Non  Am 08/20/2021 66  >60 mL/min/1.73 Final    Comment: GFR Normal >60  Chronic Kidney Disease <60  Kidney Failure <15     • eGFR  Am 08/20/2021 81  >60 mL/min/1.73 Final   • BUN/Creatinine Ratio 08/20/2021 20.4  7.0 - 25.0 Final   • Sodium 08/20/2021 138  136 - 145 mmol/L Final   • Potassium 08/20/2021 3.2* 3.5 - 5.2 mmol/L Final   • Chloride  08/20/2021 90* 98 - 107 mmol/L Final   • Total CO2 08/20/2021 27.3  22.0 - 29.0 mmol/L Final   • Calcium 08/20/2021 10.2  8.6 - 10.5 mg/dL Final   • Total Protein 08/20/2021 8.0  6.0 - 8.5 g/dL Final   • Albumin 08/20/2021 5.20  3.50 - 5.20 g/dL Final   • Globulin 08/20/2021 2.8  gm/dL Final   • A/G Ratio 08/20/2021 1.9  g/dL Final   • Total Bilirubin 08/20/2021 0.4  0.0 - 1.2 mg/dL Final   • Alkaline Phosphatase 08/20/2021 96  39 - 117 U/L Final   • AST (SGOT) 08/20/2021 199* 1 - 32 U/L Final   • ALT (SGPT) 08/20/2021 132* 1 - 33 U/L Final   • Total Cholesterol 08/20/2021 390* 0 - 200 mg/dL Final    Comment: Cholesterol Reference Ranges  (U.S. Department of Health and Human Services ATP III  Classifications)  Desirable          <200 mg/dL  Borderline High    200-239 mg/dL  High Risk          >240 mg/dL  Triglyceride Reference Ranges  (U.S. Department of Health and Human Services ATP III  Classifications)  Normal           <150 mg/dL  Borderline High  150-199 mg/dL  High             200-499 mg/dL  Very High        >500 mg/dL  HDL Reference Ranges  (U.S. Department of Health and Human Services ATP III  Classifcations)  Low     <40 mg/dl (major risk factor for CHD)  High    >60 mg/dl ('negative' risk factor for CHD)  LDL Reference Ranges  (U.S. Department of Health and Human Services ATP III  Classifcations)  Optimal          <100 mg/dL  Near Optimal     100-129 mg/dL  Borderline High  130-159 mg/dL  High             160-189 mg/dL  Very High        >189 mg/dL     • Triglycerides 08/20/2021 68  0 - 150 mg/dL Final   • HDL Cholesterol 08/20/2021 134* 40 - 60 mg/dL Final   • VLDL Cholesterol Aman 08/20/2021 8  5 - 40 mg/dL Final   • LDL Chol Calc (NIH) 08/20/2021 248* 0 - 100 mg/dL Final   • Chol/HDL Ratio 08/20/2021 2.91   Final   • TSH 08/20/2021 3.220  0.270 - 4.200 uIU/mL Final   • Hep C Virus Ab 08/20/2021 <0.1  0.0 - 0.9 s/co ratio Final    Comment:                                   Negative:     < 0.8                                Indeterminate: 0.8 - 0.9                                    Positive:     > 0.9   The CDC recommends that a positive HCV antibody result   be followed up with a HCV Nucleic Acid Amplification   test (218851).         Assessment/Plan   Problems Addressed this Visit     None      Visit Diagnoses     Panic disorder    -  Primary    Relevant Medications    QUEtiapine (SEROquel) 50 MG tablet    hydrOXYzine (ATARAX) 25 MG tablet    Alcohol dependence with alcohol-induced mood disorder (HCC)        Relevant Medications    QUEtiapine (SEROquel) 50 MG tablet    hydrOXYzine (ATARAX) 25 MG tablet      Diagnoses       Codes Comments    Panic disorder    -  Primary ICD-10-CM: F41.0  ICD-9-CM: 300.01     Alcohol dependence with alcohol-induced mood disorder (HCC)     ICD-10-CM: F10.24  ICD-9-CM: 303.90, 291.89           Visit Diagnoses:    ICD-10-CM ICD-9-CM   1. Panic disorder  F41.0 300.01   2. Alcohol dependence with alcohol-induced mood disorder (HCC)  F10.24 303.90     291.89       TREATMENT PLAN/GOALS: Continue supportive psychotherapy efforts and medications as prescribed. Treatment and medication options discussed during today's visit. Patient acknowledges and verbally consents to continue with mutually agreed upon treatment plan and was educated on the importance of compliance with medication treatment and follow-up appointments.    Short-term goal is patient will verbalize improvement in signs and symptoms of depression and anxiety.  Long-term goal is patient will be able to return to work and function with minimal signs and symptoms of anxiety.      MEDICATION ISSUES:  Provider discussed medication options and treatment plan of prescribed medication as well as the potential risks, benefits, and side effects. Patient is advised to avoid driving or operating heavy machinery if they feel drowsy or oversedated. Patient is agreeable to call the office with any worsening of symptoms or onset of intolerable  side effects. Patient is agreeable to call 911 or go to the nearest ER should he/she begin having SI/HI.     MEDS ORDERED DURING VISIT:  New Medications Ordered This Visit   Medications   • QUEtiapine (SEROquel) 50 MG tablet     Sig: Take 1 tablet by mouth Every Night.     Dispense:  30 tablet     Refill:  1   • hydrOXYzine (ATARAX) 25 MG tablet     Sig: Take 1 tablet by mouth 2 (Two) Times a Day As Needed for Itching.     Dispense:  60 tablet     Refill:  1       Return in about 2 weeks (around 11/12/2021) for Recheck.             This document has been electronically signed by TRACEY Salas  October 29, 2021 13:40 EDT    Part of this note may be an electronic transcription/translation of spoken language to printed text using the Dragon Dictation System.

## 2021-10-29 NOTE — PATIENT INSTRUCTIONS
Start Seroquel (quetiapine) 50 mg by mouth at night, monitor for sedation and other side effects    Start vistaril (hydrozyzine) 25 mg by mouth as needed for anxiety    Stop over the counter sleep aid    Continue 1:1 therapy with Kaylen POTTSW    AA/NA Meetings (1-3) a week if possible    Continue to abstain from alcohol

## 2021-11-02 ENCOUNTER — TELEMEDICINE (OUTPATIENT)
Dept: BEHAVIORAL HEALTH | Facility: CLINIC | Age: 41
End: 2021-11-02

## 2021-11-02 DIAGNOSIS — F41.1 GENERALIZED ANXIETY DISORDER: Primary | ICD-10-CM

## 2021-11-02 PROCEDURE — 90834 PSYTX W PT 45 MINUTES: CPT

## 2021-11-02 NOTE — PROGRESS NOTES
Date: November 2, 2021  Time In: 8 AM  Time Out: 8:45 AM  This provider is located at the [facility]) [address] [place] [address] using a secure [employer] Video Visit through [employer]. Patient is being seen remotely via telehealth at home address in [place] and stated they are in a secure environment for this session. The patient's condition being diagnosed/treated is appropriate for telemedicine. The provider identified herself as well as her credentials. The patient, and/or patients guardian, consent to be seen remotely, and when consent is given they understand that the consent allows for patient identifiable information to be sent to a third party as needed. They may refuse to be seen remotely at any time. The electronic data is encrypted and password protected, and the patient and/or guardian has been advised of the potential risks to privacy not withstanding such measures.     You have chosen to receive care through a telehealth visit.  Do you consent to use a video/audio connection for your medical care today? Yes    PROGRESS NOTE  Data:  Emma Villafana is a 41 y.o. female who presents today for follow up    Chief Complaint: Patient reports continuation of anxiety symptoms.    History of Present Illness: Patient reports history of anxiety and worry starting in early childhood      Clinical Maneuvering/Intervention:    Assisted patient in processing above session content; acknowledged and normalized patient’s thoughts, feelings, and concerns.  Rationalized patient thought process regarding anxiety linked to worry.  Discussed triggers associated with patient's need for control and close relationships.  Also discussed coping skills for patient to implement such as grounding techniques including deep breathing, expressive art, physical activity in the form of walks and continued sobriety from alcohol.    Allowed patient to freely discuss  issues without interruption or judgment. Provided safe, confidential environment to facilitate the development of positive therapeutic relationship and encourage open, honest communication. Assisted patient in identifying risk factors which would indicate the need for higher level of care including thoughts to harm self or others and/or self-harming behavior and encouraged patient to contact this office, call 911, or present to the nearest emergency room should any of these events occur. Discussed crisis intervention services and means to access. Patient adamantly and convincingly denies current suicidal or homicidal ideation or perceptual disturbance.    Assessment:   Assessment   Patient appears to maintain relative stability as compared to their baseline.  However, patient continues to struggle with catastrophizing worries and fears which continues to cause impairment in important areas of functioning.  A result, they can be reasonably expected to continue to benefit from treatment and would likely be at increased risk for decompensation otherwise.    Mental Status Exam:   Hygiene:   good  Cooperation:  Cooperative  Eye Contact:  Fair  Psychomotor Behavior:  Appropriate  Affect:  Appropriate  Mood: fluctates within a sad/depressive range  Speech:  Normal  Thought Process:  Linear  Thought Content:  Normal  Suicidal:  None  Homicidal:  None  Hallucinations:  None  Delusion:  None  Memory:  Intact  Orientation:  Grossly intact  Reliability:  fair  Insight:  Fair  Judgement:  Impaired  Impulse Control:  Impaired  Physical/Medical Issues:  No        Patient's Support Network Includes:  , mother and Friend group    Functional Status: Moderate impairment , Patient reports not working due to anxiety and stress level.  However, reports completing projects in the home from time to time and being very fatigued afterwards.    Progress toward goal: Not at goal, working to implement coping skills and beginning new  medication    Prognosis: Fair with Ongoing Treatment          Plan:    Patient will continue in individual outpatient therapy with focus on improved functioning and coping skills, maintaining stability, and avoiding decompensation and the need for higher level of care.    Patient will adhere to medication regimen as prescribed and report any side effects. Patient will contact this office, call 911 or present to the nearest emergency room should suicidal or homicidal ideations occur. Provide Cognitive Behavioral Therapy and Solution Focused Therapy to improve functioning, maintain stability, and avoid decompensation and the need for higher level of care.     Return in about 1-2 weeks, or earlier if symptoms worsen or fail to improve.           VISIT DIAGNOSIS:     ICD-10-CM ICD-9-CM   1. Generalized anxiety disorder  F41.1 300.02          [facility] No Show Policy:  We understand unexpected circumstances arise; however, anytime you miss your appointment we are unable to provide you appropriate care.  In addition, each appointment missed could have been used to provide care for others.  We ask that you call at least 24 hours in advance to cancel or reschedule an appointment.  We would like to take this opportunity to remind you of our policy stating patients who miss THREE or more appointments without cancelling or rescheduling 24 hours in advance of the appointment may be subject to cancellation of any further visits with our clinic and recommendation to seek in-person services/visits.    Please call [phone] to reschedule your appointment. If there are reasons that make it difficult for you to keep the appointments, please call and let us know how we can help.  Please understand that medication prescribing will not continue without seeing your provider.      [facility]'s No Show Policy reviewed with patient at today's visit. Patient verbalized understanding of this  policy. Discussed with patient that in the event that there are three or more no show visits, it will be recommended that they pursue in-person services/visits as noncompliance with telehealth visits indicates that patient is not an appropriate candidate for telemedicine and would likely be more appropriate for in-person services/visits. Patient verbalizes understanding and is agreeable to this.        This document signed by Kaylen Crowder LCSW electronically Kaylen Crowder LCSW  November 2, 2021 12:16 EDT      Part of this note may be an electronic transcription/translation of spoken language to printed text using the Dragon Dictation System.

## 2021-11-04 ENCOUNTER — TELEPHONE (OUTPATIENT)
Dept: BEHAVIORAL HEALTH | Facility: CLINIC | Age: 41
End: 2021-11-04

## 2021-11-04 ENCOUNTER — TELEPHONE (OUTPATIENT)
Dept: INTERNAL MEDICINE | Facility: CLINIC | Age: 41
End: 2021-11-04

## 2021-11-04 NOTE — TELEPHONE ENCOUNTER
Patient dentist Meredith Mo called in wanting to verify if it would be ok for patient to go under sedation for dental work. Sedation is combined with Halcion and Nitrace Gas. Patient is currently on meds  Lexapro, Gabapentin, Seroquel, Hydroxyzine. Patient has dental procedure Monday 11/8/21.   Call back DIVINE Mo cell [phone]    Please Advise

## 2021-11-04 NOTE — TELEPHONE ENCOUNTER
Fernando should probably clear this with patient's psychiatrist.  In general I think the nitric oxide gases not going to be a problem but Halcion could be a significant problem with oversedation with her other medications.  I would be reluctant to give her the Halcion.

## 2021-11-04 NOTE — TELEPHONE ENCOUNTER
"Call returned to Dr. Carol Mo r/t medication's for upcoming tooth extraction, pt currently takes lexapro, gabapentin, Seroquel and vistaril. MD wanting to give pt nitic oxide and halcion seeking recommendation from psych NP.    No major drug interactions identified, just to monitor for over-sedation per [employer]. MD advised to monitor for over sedation and to continue current practice of avoiding PRN \"sedating\" medication for 24 hrs prior to operation, if needed only provide a one-time dose of halcion to take prior to procedure. Continue practice of pt needing a  to take home and monitor post procedure.    Call returned 11/4/21 1425 [phone]    Joseph DAVILA  "

## 2021-11-11 ENCOUNTER — TELEMEDICINE (OUTPATIENT)
Dept: BEHAVIORAL HEALTH | Facility: CLINIC | Age: 41
End: 2021-11-11

## 2021-11-11 DIAGNOSIS — F41.1 GENERALIZED ANXIETY DISORDER: Primary | ICD-10-CM

## 2021-11-11 PROCEDURE — 90832 PSYTX W PT 30 MINUTES: CPT

## 2021-11-11 NOTE — PROGRESS NOTES
Access to MH/SA Psychotherapy Notes:  A licensed health care professional has determined, in the exercise of professional judgment, that the access requested is reasonably likely to endanger the life or physical safety of the individual or another person.  Date: November 12, 2021  Time In: 9:06 AM  Time Out: 9:33 AM  This provider is located at the [facility] ([facility]) [address] [place] [address] using a secure [employer] Video Visit through [employer]. Patient is being seen remotely via telehealth at home address in [place] and stated they are in a secure environment for this session. The patient's condition being diagnosed/treated is appropriate for telemedicine. The provider identified herself as well as her credentials. The patient, and/or patients guardian, consent to be seen remotely, and when consent is given they understand that the consent allows for patient identifiable information to be sent to a third party as needed. They may refuse to be seen remotely at any time. The electronic data is encrypted and password protected, and the patient and/or guardian has been advised of the potential risks to privacy not withstanding such measures.     You have chosen to receive care through a telehealth visit.  Do you consent to use a video/audio connection for your medical care today? Yes    PROGRESS NOTE  Data:  Emma Villafana is a 41 y.o. female who presents today for follow up    Chief Complaint: Patient reports continued symptoms of anxiety and depression.  Patient reports her increased worry has continued.  Patient reports she is also stressing of her finances.  Patient reports having spurts of energy and then feeling very fatigued and lethargic.  Patient reports she does not get out of the house much due to her  having the car.  Patient reports she tries to walk around the neighborhood when able.  Patient also reports experiencing weird,  vivid dreaming since starting a new medication.    History of Present Illness: Patient reports a history of anxiety and depression beginning in June when she experienced panic attack.      Clinical Maneuvering/Intervention:    Assisted patient in processing above session content; acknowledged and normalized patient’s thoughts, feelings, and concerns.  Rationalized patient thought process regarding cognitive distortions.  Discussed triggers associated with patient's excessive worry.  Also discussed coping skills for patient to implement such as journaling, physical exercise, therapy, grounding techniques and being mindful, communicating with her .    Allowed patient to freely discuss issues without interruption or judgment. Provided safe, confidential environment to facilitate the development of positive therapeutic relationship and encourage open, honest communication. Assisted patient in identifying risk factors which would indicate the need for higher level of care including thoughts to harm self or others and/or self-harming behavior and encouraged patient to contact this office, call 911, or present to the nearest emergency room should any of these events occur. Discussed crisis intervention services and means to access. Patient adamantly and convincingly denies current suicidal or homicidal ideation or perceptual disturbance.    Assessment:   Assessment   Patient appears to maintain relative stability as compared to their baseline.  However, patient continues to struggle with anxiety management which continues to cause impairment in important areas of functioning.  A result, they can be reasonably expected to continue to benefit from treatment and would likely be at increased risk for decompensation otherwise.    Mental Status Exam:   Hygiene:   good  Cooperation:  Cooperative  Eye Contact:  Fair  Psychomotor Behavior:  Appropriate  Affect:  Full range and Appropriate  Mood: normal  Speech:  normal,  however, difficult to speak a lot due to recent dental work  Thought Process:  Goal directed and Linear  Thought Content:  Normal  Suicidal:  None  Homicidal:  None  Hallucinations:  None  Delusion:  None  Memory:  Intact  Orientation:  Grossly intact  Reliability:  fair  Insight:  Fair  Judgement:  Fair and Impaired  Impulse Control:  Impaired  Physical/Medical Issues:  Yes patient had recent dental work on Monday 11/8/21. Dental work causing difficulties with speaking. Patient reported physical pain in mouth when speaking.       Patient's Support Network Includes:  , mother and friends    Functional Status: Moderate impairment     Progress toward goal: Not at goal    Prognosis: Good with Ongoing Treatment          Plan:    Patient will continue in individual outpatient therapy with focus on improved functioning and coping skills, maintaining stability, and avoiding decompensation and the need for higher level of care.    Patient will adhere to medication regimen as prescribed and report any side effects. Patient will contact this office, call 911 or present to the nearest emergency room should suicidal or homicidal ideations occur. Provide Cognitive Behavioral Therapy and Solution Focused Therapy to improve functioning, maintain stability, and avoid decompensation and the need for higher level of care.     Return in about 1-2 weeks, or earlier if symptoms worsen or fail to improve.           VISIT DIAGNOSIS:     ICD-10-CM ICD-9-CM   1. Generalized anxiety disorder  F41.1 300.02          [facility] No Show Policy:  We understand unexpected circumstances arise; however, anytime you miss your appointment we are unable to provide you appropriate care.  In addition, each appointment missed could have been used to provide care for others.  We ask that you call at least 24 hours in advance to cancel or reschedule an appointment.  We would like to take this opportunity to remind you of our  policy stating patients who miss THREE or more appointments without cancelling or rescheduling 24 hours in advance of the appointment may be subject to cancellation of any further visits with our clinic and recommendation to seek in-person services/visits.    Please call [phone] to reschedule your appointment. If there are reasons that make it difficult for you to keep the appointments, please call and let us know how we can help.  Please understand that medication prescribing will not continue without seeing your provider.      [facility]'s No Show Policy reviewed with patient at today's visit. Patient verbalized understanding of this policy. Discussed with patient that in the event that there are three or more no show visits, it will be recommended that they pursue in-person services/visits as noncompliance with telehealth visits indicates that patient is not an appropriate candidate for telemedicine and would likely be more appropriate for in-person services/visits. Patient verbalizes understanding and is agreeable to this.        This document signed by Kaylen Crowder LCSW electronically Kaylen Crowder LCSW  November 12, 2021 16:34 EST      Part of this note may be an electronic transcription/translation of spoken language to printed text using the Dragon Dictation System.

## 2021-11-12 ENCOUNTER — TELEMEDICINE (OUTPATIENT)
Dept: BEHAVIORAL HEALTH | Facility: CLINIC | Age: 41
End: 2021-11-12

## 2021-11-12 DIAGNOSIS — F39 UNSPECIFIED MOOD (AFFECTIVE) DISORDER (HCC): Primary | ICD-10-CM

## 2021-11-12 DIAGNOSIS — F41.1 GENERALIZED ANXIETY DISORDER: ICD-10-CM

## 2021-11-12 PROCEDURE — 99213 OFFICE O/P EST LOW 20 MIN: CPT

## 2021-11-12 RX ORDER — QUETIAPINE FUMARATE 50 MG/1
50 TABLET, FILM COATED ORAL NIGHTLY
Qty: 30 TABLET | Refills: 2 | Status: SHIPPED | OUTPATIENT
Start: 2021-11-12

## 2021-11-12 NOTE — PATIENT INSTRUCTIONS
1. No med changes  2. Continue seroqul (quetiapine) 50 mg by mouth at bedtime (refills sent to pharmacy)  3. Continue lexapro (escitalpram) 10 mg by mouth a day  4. Continue vistaril (hydroxyzine) 25 mg twice a day as needed acute anxiety  5. Continue 1:1 therapy with Kaylen Crowder LCSW  6. AA meeting's encouraged   7. + Coping skills which include leisure skills, art, coloring

## 2021-11-12 NOTE — PROGRESS NOTES
"Patient assessed today via MyChart through EPIC pt is at home in a secure environment and verbalizes privacy during interview. LOLA Ruiz is at home in a secure environment using a secure laptop. The patient's condition being diagnosed/treated is appropriate for telemedicine. The provider identified himself as well as his credentials.   The patient, and/or patient's guardian, consent to be seen remotely, and when consent is given they understand that the consent allows for patient identifiable information to be sent to a third party as needed.   They may refuse to be seen remotely at any time. The electronic data is encrypted and password protected, and the patient and/or guardian has been advised of the potential risks to privacy not withstanding such measures.     Subjective   Emma Villafana is a 41 y.o. female who presents today for follow up    Chief Complaint:  \"mood, anxiety\" per pt     History of Present Illness:    Pt presents today for 2 week f/u initiation of Seroquel and vistaril, pt reports improvement in s/s of depression/anxiety, pt reports she hasn't needed her prn vistaril for acute anxiety, and that her mood is more stable and is noticeable by pt . Pt sleeping around 7 hrs/night, no major side effects to medications except on/off vivid dreams that are manageable. Pt reports that she has maintained her sobriety, AA/NA meetings, outpatient treatment and medication options discussed for addiction as needed. Pt reports feeling stable on her meds, no changes are needed at this time, likes her lexapro at 10 mg/day and seroquel at 50 mg at bedtime, working on coping skills including art/coloring. Pt continues to see Kaylen POTTSW weekly, pt reports therapy as helping. No other issues voiced.    The following portions of the patient's history were reviewed and updated as appropriate: allergies, current medications, past family history, past medical history, past social history, past surgical " history and problem list.      Past Medical History:  Past Medical History:   Diagnosis Date   • Anxiety        Social History:  Social History     Socioeconomic History   • Marital status:    Tobacco Use   • Smoking status: Former Smoker     Packs/day: 1.00     Years: 19.00     Pack years: 19.00     Types: Cigarettes     Start date:      Quit date: 2017     Years since quittin.2   • Smokeless tobacco: Never Used   Substance and Sexual Activity   • Alcohol use: Yes     Alcohol/week: 0.0 - 2.0 standard drinks     Comment: 1-2 x month   • Drug use: No       Family History:  Family History   Problem Relation Age of Onset   • Arthritis Mother    • Lung cancer Father    • No Known Problems Brother        Past Surgical History:  No past surgical history on file.    Problem List:  Patient Active Problem List   Diagnosis   • Alcohol abuse   • Anxiety   • Essential hypertension   • Dysthymia   • Generalized anxiety disorder       Allergy:   Allergies   Allergen Reactions   • Penicillins Unknown - Low Severity        Current Medications:   Current Outpatient Medications   Medication Sig Dispense Refill   • Biotin 10 MG capsule Take 10 mg by mouth Daily.     • escitalopram (LEXAPRO) 10 MG tablet Take 10 mg by mouth Daily.     • gabapentin (NEURONTIN) 100 MG capsule Take 2 capsules by mouth 3 (Three) Times a Day. 540 capsule 0   • hydrOXYzine (ATARAX) 25 MG tablet Take 1 tablet by mouth 2 (Two) Times a Day As Needed for Itching. 60 tablet 1   • ibuprofen (IBU) 200 MG tablet Take 200 mg by mouth Daily.     • loratadine (ALAVERT) 10 MG tablet Take 10 mg by mouth Daily.     • potassium chloride (MICRO-K) 10 MEQ CR capsule Take 10 mEq by mouth Daily.     • propranolol (INDERAL) 40 MG tablet TAKE 1 TABLET BY MOUTH 3 (THREE) TIMES A DAY AS NEEDED (ANXIETY). 90 tablet 0   • QUEtiapine (SEROquel) 50 MG tablet Take 1 tablet by mouth Every Night. 30 tablet 2   • triamterene-hydrochlorothiazide (MAXZIDE) 75-50 MG per  tablet TAKE 1 TABLET BY MOUTH EVERY DAY 90 tablet 3     No current facility-administered medications for this visit.       Review of Symptoms:    Review of Systems   Constitutional: Negative.    Respiratory: Negative.    Cardiovascular: Negative.          Physical Exam:   There were no vitals taken for this visit.   There is no height or weight on file to calculate BMI.    Due to extenuating circumstances, the patient was seen remotely today via a mychart.  Unable to obtain vital signs due to nature of remote visit.    Mental Status Exam:   Hygiene:   good  Cooperation:  Cooperative  Eye Contact:  Good  Psychomotor Behavior:  Appropriate  Affect:  Appropriate  Mood: normal  Hopelessness: Denies  Speech:  Normal  Thought Process:  Goal directed  Thought Content:  Normal  Suicidal:  None  Homicidal:  None  Hallucinations:  None  Delusion:  None  Memory:  Intact  Orientation:  Person, Place, Time and Situation  Reliability:  good  Insight:  Good  Judgement:  Good  Impulse Control:  Good  Physical/Medical Issues:  reviewed       Lab Results:   No visits with results within 1 Month(s) from this visit.   Latest known visit with results is:   Office Visit on 08/20/2021   Component Date Value Ref Range Status   • WBC 08/20/2021 6.44  3.40 - 10.80 10*3/mm3 Final   • RBC 08/20/2021 3.78  3.77 - 5.28 10*6/mm3 Final   • Hemoglobin 08/20/2021 12.5  12.0 - 15.9 g/dL Final   • Hematocrit 08/20/2021 37.4  34.0 - 46.6 % Final   • MCV 08/20/2021 98.9* 79.0 - 97.0 fL Final   • MCH 08/20/2021 33.1* 26.6 - 33.0 pg Final   • MCHC 08/20/2021 33.4  31.5 - 35.7 g/dL Final   • RDW 08/20/2021 15.5* 12.3 - 15.4 % Final   • Platelets 08/20/2021 242  140 - 450 10*3/mm3 Final   • Neutrophil Rel % 08/20/2021 47.0  42.7 - 76.0 % Final   • Lymphocyte Rel % 08/20/2021 39.1  19.6 - 45.3 % Final   • Monocyte Rel % 08/20/2021 9.0  5.0 - 12.0 % Final   • Eosinophil Rel % 08/20/2021 3.0  0.3 - 6.2 % Final   • Basophil Rel % 08/20/2021 1.6* 0.0 - 1.5 %  Final   • Neutrophils Absolute 08/20/2021 3.03  1.70 - 7.00 10*3/mm3 Final   • Lymphocytes Absolute 08/20/2021 2.52  0.70 - 3.10 10*3/mm3 Final   • Monocytes Absolute 08/20/2021 0.58  0.10 - 0.90 10*3/mm3 Final   • Eosinophils Absolute 08/20/2021 0.19  0.00 - 0.40 10*3/mm3 Final   • Basophils Absolute 08/20/2021 0.10  0.00 - 0.20 10*3/mm3 Final   • Immature Granulocyte Rel % 08/20/2021 0.3  0.0 - 0.5 % Final   • Immature Grans Absolute 08/20/2021 0.02  0.00 - 0.05 10*3/mm3 Final   • nRBC 08/20/2021 0.0  0.0 - 0.2 /100 WBC Final   • Glucose 08/20/2021 73  65 - 99 mg/dL Final   • BUN 08/20/2021 19  6 - 20 mg/dL Final   • Creatinine 08/20/2021 0.93  0.57 - 1.00 mg/dL Final   • eGFR Non  Am 08/20/2021 66  >60 mL/min/1.73 Final    Comment: GFR Normal >60  Chronic Kidney Disease <60  Kidney Failure <15     • eGFR  Am 08/20/2021 81  >60 mL/min/1.73 Final   • BUN/Creatinine Ratio 08/20/2021 20.4  7.0 - 25.0 Final   • Sodium 08/20/2021 138  136 - 145 mmol/L Final   • Potassium 08/20/2021 3.2* 3.5 - 5.2 mmol/L Final   • Chloride 08/20/2021 90* 98 - 107 mmol/L Final   • Total CO2 08/20/2021 27.3  22.0 - 29.0 mmol/L Final   • Calcium 08/20/2021 10.2  8.6 - 10.5 mg/dL Final   • Total Protein 08/20/2021 8.0  6.0 - 8.5 g/dL Final   • Albumin 08/20/2021 5.20  3.50 - 5.20 g/dL Final   • Globulin 08/20/2021 2.8  gm/dL Final   • A/G Ratio 08/20/2021 1.9  g/dL Final   • Total Bilirubin 08/20/2021 0.4  0.0 - 1.2 mg/dL Final   • Alkaline Phosphatase 08/20/2021 96  39 - 117 U/L Final   • AST (SGOT) 08/20/2021 199* 1 - 32 U/L Final   • ALT (SGPT) 08/20/2021 132* 1 - 33 U/L Final   • Total Cholesterol 08/20/2021 390* 0 - 200 mg/dL Final    Comment: Cholesterol Reference Ranges  (U.S. Department of Health and Human Services ATP III  Classifications)  Desirable          <200 mg/dL  Borderline High    200-239 mg/dL  High Risk          >240 mg/dL  Triglyceride Reference Ranges  (U.S. Department of Health and Human Services ATP  III  Classifications)  Normal           <150 mg/dL  Borderline High  150-199 mg/dL  High             200-499 mg/dL  Very High        >500 mg/dL  HDL Reference Ranges  (U.S. Department of Health and Human Services ATP III  Classifcations)  Low     <40 mg/dl (major risk factor for CHD)  High    >60 mg/dl ('negative' risk factor for CHD)  LDL Reference Ranges  (U.S. Department of Health and Human Services ATP III  Classifcations)  Optimal          <100 mg/dL  Near Optimal     100-129 mg/dL  Borderline High  130-159 mg/dL  High             160-189 mg/dL  Very High        >189 mg/dL     • Triglycerides 08/20/2021 68  0 - 150 mg/dL Final   • HDL Cholesterol 08/20/2021 134* 40 - 60 mg/dL Final   • VLDL Cholesterol Aman 08/20/2021 8  5 - 40 mg/dL Final   • LDL Chol Calc ([facility]) 08/20/2021 248* 0 - 100 mg/dL Final   • Chol/HDL Ratio 08/20/2021 2.91   Final   • TSH 08/20/2021 3.220  0.270 - 4.200 uIU/mL Final   • Hep C Virus Ab 08/20/2021 <0.1  0.0 - 0.9 s/co ratio Final    Comment:                                   Negative:     < 0.8                               Indeterminate: 0.8 - 0.9                                    Positive:     > 0.9   The CDC recommends that a positive HCV antibody result   be followed up with a HCV Nucleic Acid Amplification   test ([id number]).         Assessment/Plan   Problems Addressed this Visit        Mental Health    Generalized anxiety disorder    Relevant Medications    QUEtiapine (SEROquel) 50 MG tablet      Other Visit Diagnoses     Unspecified mood (affective) disorder (HCC)    -  Primary    Relevant Medications    QUEtiapine (SEROquel) 50 MG tablet      Diagnoses       Codes Comments    Unspecified mood (affective) disorder (HCC)    -  Primary ICD-10-CM: F39  ICD-9-CM: 296.90     Generalized anxiety disorder     ICD-10-CM: F41.1  ICD-9-CM: 300.02           Visit Diagnoses:    ICD-10-CM ICD-9-CM   1. Unspecified mood (affective) disorder (HCC)  F39 296.90   2. Generalized anxiety disorder   F41.1 300.02         GOALS:  Short Term Goals: Patient will adhere to medication treatment plan as agreed and  will have no significant medication related side effects.Patient will report subjective improvement of symptoms and engage in psychotherapy as appropriate    Long term goals: Patient will report improved mood and increase physical activity. Patient will be at an optimal level of functioning, and take all medications as prescribed.    The patient/guardian verbalized understanding and agreement with goals that were mutually set.      TREATMENT PLAN: Continue supportive psychotherapy efforts and medications as discussed.  Pharmacological and Non-Pharmacological treatment options discussed during today's visit. Patient/Guardian acknowledge and verbally consent to pursue mutually agreed upon treatment plan. Patient  reminded of the role medication and therapy play in symptom improvement.  Importance of taking medication consistently and keeping  follow-up appointments discussed.     1. No med changes  2. Continue seroqul (quetiapine) 50 mg by mouth at bedtime (refills sent to pharmacy)  3. Continue lexapro (escitalpram) 10 mg by mouth a day  4. Continue vistaril (hydroxyzine) 25 mg twice a day as needed acute anxiety  5. Continue 1:1 therapy with Kaylen Crowder LCSW  6. AA meeting's encouraged   7. + Coping skills which include leisure skills, art, coloring     MEDICATION ISSUES:  Discussed  medication treatment plan as well as the potential risks, benefits, and side effects. Patient advised to refrain from engaging in activities requiring mental clarity until sedative effects of medication are assessed.  Patient is agreeable to call the office with any worsening of symptoms or onset of side effects, or if any concerns or questions arise.  The contact information for the office is made available to the patient. Patient is agreeable to call 911 or go to the nearest ER should they begin having any SI/HI, or if any  urgent concerns arise.      MEDS ORDERED DURING VISIT:  New Medications Ordered This Visit   Medications   • QUEtiapine (SEROquel) 50 MG tablet     Sig: Take 1 tablet by mouth Every Night.     Dispense:  30 tablet     Refill:  2       Return in 4 weeks (on 12/10/2021).               This document has been electronically signed by TRACEY Salas  November 12, 2021 11:15 EST    Part of this note may be an electronic transcription/translation of spoken language to printed text using the Dragon Dictation System.

## 2021-11-19 ENCOUNTER — TELEMEDICINE (OUTPATIENT)
Dept: INTERNAL MEDICINE | Facility: CLINIC | Age: 41
End: 2021-11-19

## 2021-11-19 DIAGNOSIS — F10.10 ALCOHOL ABUSE: ICD-10-CM

## 2021-11-19 DIAGNOSIS — F34.1 DYSTHYMIA: Chronic | ICD-10-CM

## 2021-11-19 DIAGNOSIS — E87.6 LOW SERUM POTASSIUM: ICD-10-CM

## 2021-11-19 DIAGNOSIS — I10 ESSENTIAL HYPERTENSION: Primary | Chronic | ICD-10-CM

## 2021-11-19 DIAGNOSIS — F41.9 ANXIETY: ICD-10-CM

## 2021-11-19 PROCEDURE — 99214 OFFICE O/P EST MOD 30 MIN: CPT | Performed by: INTERNAL MEDICINE

## 2021-11-19 NOTE — PROGRESS NOTES
Subjective   Emma Villafana is a 41 y.o. female.     Chief Complaint   Patient presents with   • Anxiety         Anxiety  Presents for follow-up visit. Symptoms include nervous/anxious behavior and panic. Patient reports no depressed mood.            The following portions of the patient's history were reviewed and updated as appropriate: allergies, current medications, past social history and problem list.    Outpatient Medications Marked as Taking for the 11/19/21 encounter (Telemedicine) with Kalpesh Fair MD   Medication Sig Dispense Refill   • Biotin 10 MG capsule Take 10 mg by mouth Daily.     • escitalopram (LEXAPRO) 10 MG tablet Take 10 mg by mouth Daily.     • gabapentin (NEURONTIN) 100 MG capsule Take 2 capsules by mouth 3 (Three) Times a Day. 540 capsule 0   • hydrOXYzine (ATARAX) 25 MG tablet Take 1 tablet by mouth 2 (Two) Times a Day As Needed for Itching. 60 tablet 1   • ibuprofen (IBU) 200 MG tablet Take 200 mg by mouth Daily.     • loratadine (ALAVERT) 10 MG tablet Take 10 mg by mouth Daily.     • propranolol (INDERAL) 40 MG tablet TAKE 1 TABLET BY MOUTH 3 (THREE) TIMES A DAY AS NEEDED (ANXIETY). 90 tablet 0   • QUEtiapine (SEROquel) 50 MG tablet Take 1 tablet by mouth Every Night. 30 tablet 2   • triamterene-hydrochlorothiazide (MAXZIDE) 75-50 MG per tablet TAKE 1 TABLET BY MOUTH EVERY DAY 90 tablet 3       Review of Systems   Musculoskeletal: Arthralgias: left ankle.   Psychiatric/Behavioral: Positive for dysphoric mood. The patient is nervous/anxious.        Objective   There were no vitals filed for this visit.   There were no vitals filed for this visit. [unfilled]  There is no height or weight on file to calculate BMI.      Physical Exam   Constitutional: She appears well-developed.   Pulmonary/Chest: Effort normal.   Abdominal: Normal appearance.   Neurological: She is alert.   Psychiatric: Her behavior is normal. Mood, judgment and thought content normal.          Problems Addressed  this Visit        Cardiac and Vasculature    Essential hypertension (Chronic)       Mental Health    Dysthymia (Chronic)    Anxiety - Primary      Diagnoses       Codes Comments    Anxiety    -  Primary ICD-10-CM: F41.9  ICD-9-CM: 300.00     Essential hypertension     ICD-10-CM: I10  ICD-9-CM: 401.9     Dysthymia     ICD-10-CM: F34.1  ICD-9-CM: 300.4         Assessment/Plan   Video visit today due to Covid pandemic.  She is in for recheck on anxiety.  She's been off of tobacco since January 2019.  Doing very well with her tobacco cessation.   History of chronic alcohol abuse.  Currently sober since 10/13/2021.  She still has periodic and intermittent anxiety.  Recent panic attacks.  Inderal not holding her well enough.   On gabapentin 100 mg 3 times daily and lexapro 10 mg daily.  Also on Seroquel 50 mg at bedtime.  Anxiety is better.  Stress is still high.  In between jobs right now.  Short-term disability was denied.  Seeing a psychiatrist now.  Possibility of bipolar is not clear yet.  She is taking Maxide every day for hypertension.  Takes propranolol on an as-needed basis mainly for anxiety.  Those are reviewed today.  Need to recheck a potassium.  Will check blood pressure when she comes in for lab work.  Pneumovax at the same time.  We will also need to consider getting hepatitis A and B immunizations.  We will check a hepatitis B antibody to see if she is had previous immunizations.  Spent 12 minutes with patient on the visit today.  [employer] platform used.    The above information was reviewed again today 11/19/21.  It continues to be accurate as reflected above and is unchanged.  History, physical and review of systems all reviewed and are unchanged.  Medications were reviewed today and continue the current dosing.         Dragon disclaimer:   Much of this encounter note is an electronic transcription/translation of spoken language to printed text. The electronic translation of spoken language may permit  erroneous, or at times, nonsensical words or phrases to be inadvertently transcribed; Although I have reviewed the note for such errors, some may still exist.

## 2021-11-21 RX ORDER — HYDROXYZINE HYDROCHLORIDE 25 MG/1
25 TABLET, FILM COATED ORAL 2 TIMES DAILY PRN
Qty: 60 TABLET | Refills: 1 | Status: SHIPPED | OUTPATIENT
Start: 2021-11-21

## 2021-11-23 ENCOUNTER — TELEMEDICINE (OUTPATIENT)
Dept: BEHAVIORAL HEALTH | Facility: CLINIC | Age: 41
End: 2021-11-23

## 2021-11-23 DIAGNOSIS — F41.1 GENERALIZED ANXIETY DISORDER: Primary | ICD-10-CM

## 2021-11-23 PROCEDURE — 90832 PSYTX W PT 30 MINUTES: CPT

## 2021-11-23 NOTE — PROGRESS NOTES
Access to MH/SA Psychotherapy Notes:  A licensed health care professional has determined, in the exercise of professional judgment, that the access requested is reasonably likely to endanger the life or physical safety of the individual or another person.  Date: November 23, 2021  Time In: 8:00 AM  Time Out: 8:33 AM  This provider is located at the [facility]) [address] [place] [address] using a secure [employer] Video Visit through [employer] Patient is being seen remotely via telehealth at home address in [place] and stated they are in a secure environment for this session. The patient's condition being diagnosed/treated is appropriate for telemedicine. The provider identified herself as well as her credentials. The patient, and/or patients guardian, consent to be seen remotely, and when consent is given they understand that the consent allows for patient identifiable information to be sent to a third party as needed. They may refuse to be seen remotely at any time. The electronic data is encrypted and password protected, and the patient and/or guardian has been advised of the potential risks to privacy not withstanding such measures.     You have chosen to receive care through a telehealth visit.  Do you consent to use a video/audio connection for your medical care today? Yes    PROGRESS NOTE  Data:  Emma Villafana is a 41 y.o. female who presents today for follow up    Chief Complaint: Patient reports struggling emotionally this morning due to missing her father.  Patient also reports feeling conflicted regarding her job.  She reports she knows she needs to put in her notice at her current employer.  However, she feels guilty and that she is letting her dad and father-in-law down.  Patient also reports that this is the time of year that her father was sick and passed.  Patient reports is experiencing resurfaced grief and trauma related to  losing her father and father-in-law.     History of Present Illness: Patient has a history of anxiety and depression.  Patient has heightened symptoms due to environmental stressors and unresolved grief/trauma.      Clinical Maneuvering/Intervention:    Assisted patient in processing above session content; acknowledged and normalized patient’s thoughts, feelings, and concerns.  Rationalized patient thought process regarding losing her father.  Discussed triggers associated with patient's time of year and a holiday experience.  Also discussed coping skills for patient to implement such as open communication with support system, being open and expressing her emotions, journaling, and deep breathing exercises.    Allowed patient to freely discuss issues without interruption or judgment. Provided safe, confidential environment to facilitate the development of positive therapeutic relationship and encourage open, honest communication. Assisted patient in identifying risk factors which would indicate the need for higher level of care including thoughts to harm self or others and/or self-harming behavior and encouraged patient to contact this office, call 911, or present to the nearest emergency room should any of these events occur. Discussed crisis intervention services and means to access. Patient adamantly and convincingly denies current suicidal or homicidal ideation or perceptual disturbance.    Assessment:   Assessment   Patient appears to maintain relative stability as compared to their baseline.  However, patient continues to struggle with taking action and making decisions which continues to cause impairment in important areas of functioning.  A result, they can be reasonably expected to continue to benefit from treatment and would likely be at increased risk for decompensation otherwise.    Mental Status Exam:   Hygiene:   fair  Cooperation:  Cooperative  Eye Contact:  Fair  Psychomotor Behavior:   Appropriate  Affect:  Full range  Mood: sad and Emotional  Speech:  Normal and emotional  Thought Process:  Linear  Thought Content:  Mood congruent  Suicidal:  None  Homicidal:  None  Hallucinations:  None  Delusion:  None  Memory:  Intact  Orientation:  Grossly intact  Reliability:  fair  Insight:  Fair  Judgement:  Impaired  Impulse Control:  Impaired  Physical/Medical Issues:  No        Patient's Support Network Includes:  , mother, extended family and friends    Functional Status: Moderate impairment     Progress toward goal: Not at goal    Prognosis: Fair with Ongoing Treatment          Plan:    Patient will continue in individual outpatient therapy with focus on improved functioning and coping skills, maintaining stability, and avoiding decompensation and the need for higher level of care.    Patient will adhere to medication regimen as prescribed and report any side effects. Patient will contact this office, call 911 or present to the nearest emergency room should suicidal or homicidal ideations occur. Provide Cognitive Behavioral Therapy and Solution Focused Therapy to improve functioning, maintain stability, and avoid decompensation and the need for higher level of care.     Return in about 1-2 weeks, or earlier if symptoms worsen or fail to improve.           VISIT DIAGNOSIS:     ICD-10-CM ICD-9-CM   1. Generalized anxiety disorder  F41.1 300.02          [facility] No Show Policy:  We understand unexpected circumstances arise; however, anytime you miss your appointment we are unable to provide you appropriate care.  In addition, each appointment missed could have been used to provide care for others.  We ask that you call at least 24 hours in advance to cancel or reschedule an appointment.  We would like to take this opportunity to remind you of our policy stating patients who miss THREE or more appointments without cancelling or rescheduling 24 hours in advance of the  appointment may be subject to cancellation of any further visits with our clinic and recommendation to seek in-person services/visits.    Please call [phone] to reschedule your appointment. If there are reasons that make it difficult for you to keep the appointments, please call and let us know how we can help.  Please understand that medication prescribing will not continue without seeing your provider.      [facility]'s No Show Policy reviewed with patient at today's visit. Patient verbalized understanding of this policy. Discussed with patient that in the event that there are three or more no show visits, it will be recommended that they pursue in-person services/visits as noncompliance with telehealth visits indicates that patient is not an appropriate candidate for telemedicine and would likely be more appropriate for in-person services/visits. Patient verbalizes understanding and is agreeable to this.        This document signed by Kaylen Crowder LCSW electronically Kaylen Crowder LCSW  November 23, 2021 08:45 EST      Part of this note may be an electronic transcription/translation of spoken language to printed text using the Dragon Dictation System.

## 2021-11-23 NOTE — PROGRESS NOTES
I have reviewed the notes, assessments, and/or procedures performed by Joe Noguera , I concur with her/his documentation of Emma Villafana.

## 2021-11-30 ENCOUNTER — TELEMEDICINE (OUTPATIENT)
Dept: BEHAVIORAL HEALTH | Facility: CLINIC | Age: 41
End: 2021-11-30

## 2021-11-30 DIAGNOSIS — F41.1 GENERALIZED ANXIETY DISORDER: Primary | ICD-10-CM

## 2021-11-30 PROCEDURE — 90834 PSYTX W PT 45 MINUTES: CPT

## 2021-11-30 NOTE — PROGRESS NOTES
"Access to MH/SA Psychotherapy Notes:  A licensed health care professional has determined, in the exercise of professional judgment, that the access requested is reasonably likely to endanger the life or physical safety of the individual or another person.  Date: November 30, 2021  Time In: 8:00 AM  Time Out: 8:45 AM  This provider is located at the [facility]) [address] [place] [address] using a secure [employer] Video Visit through [employer]. Patient is being seen remotely via telehealth at home address in [place] and stated they are in a secure environment for this session. The patient's condition being diagnosed/treated is appropriate for telemedicine. The provider identified herself as well as her credentials. The patient, and/or patients guardian, consent to be seen remotely, and when consent is given they understand that the consent allows for patient identifiable information to be sent to a third party as needed. They may refuse to be seen remotely at any time. The electronic data is encrypted and password protected, and the patient and/or guardian has been advised of the potential risks to privacy not withstanding such measures.     You have chosen to receive care through a telehealth visit.  Do you consent to use a video/audio connection for your medical care today? Yes    PROGRESS NOTE  Data:  Emma Villafana is a 41 y.o. female who presents today for follow up    Chief Complaint: Patient reports continued symptoms of anxiety.  Patient reports grief unresolved and unaddressed with her father as well.  Patient reports she had another \"episode \"on Thanksgiving day.  Patient reports her  made a comment about her gaining weight, her thoughts spiraled into the theme of not being good enough.  The patient described her behaviors that followed were spending 14 hours on her couch crying and not attending Thanksgiving at her mother's " home.  Patient reports her feelings during and after this event for shameful.  Patient reports she is experiencing increased reactions to triggers during this time of year due to losing her father during the holiday season.    History of Present Illness: Patient reports a history of anxiety and excessive worry.  Patient also has unprocessed grief related to losing her father.      Clinical Maneuvering/Intervention:    Assisted patient in processing above session content; acknowledged and normalized patient’s thoughts, feelings, and concerns.  Rationalized patient thought process regarding cognitive distortions/personalization.  Discussed triggers associated with patient's 's, mental and physical appearance.  Also discussed coping skills for patient to implement such as journaling, self-care and walking.  Patient reports she will work on journaling about what is blocking her from grieving her father.  Patient expressed she does not know how to grieve therefore, she will brainstorm potential barriers to evaluate later.    Allowed patient to freely discuss issues without interruption or judgment. Provided safe, confidential environment to facilitate the development of positive therapeutic relationship and encourage open, honest communication. Assisted patient in identifying risk factors which would indicate the need for higher level of care including thoughts to harm self or others and/or self-harming behavior and encouraged patient to contact this office, call 911, or present to the nearest emergency room should any of these events occur. Discussed crisis intervention services and means to access. Patient adamantly and convincingly denies current suicidal or homicidal ideation or perceptual disturbance.    Assessment:   Assessment   Patient appears to maintain relative stability as compared to their baseline.  However, patient continues to struggle with emotional regulation and anxiety which continues to cause  impairment in important areas of functioning.  A result, they can be reasonably expected to continue to benefit from treatment and would likely be at increased risk for decompensation otherwise.    Mental Status Exam:   Hygiene:   good  Cooperation:  Cooperative  Eye Contact:  Fair  Psychomotor Behavior:  Appropriate  Affect:  Full range  Mood: normal and fluctates  Speech:  Normal  Thought Process:  Linear  Thought Content:  Mood congruent  Suicidal:  None  Homicidal:  None  Hallucinations:  None  Delusion:  None  Memory:  Intact  Orientation:  Grossly intact  Reliability:  fair  Insight:  Poor  Judgement:  Impaired  Impulse Control:  Impaired  Physical/Medical Issues:  No        Patient's Support Network Includes:  , mother, extended family and Friends    Functional Status: Moderate impairment     Progress toward goal: Not at goal    Prognosis: Good with Ongoing Treatment          Plan:    Patient will continue in individual outpatient therapy with focus on improved functioning and coping skills, maintaining stability, and avoiding decompensation and the need for higher level of care.    Patient will adhere to medication regimen as prescribed and report any side effects. Patient will contact this office, call 911 or present to the nearest emergency room should suicidal or homicidal ideations occur. Provide Cognitive Behavioral Therapy and Solution Focused Therapy to improve functioning, maintain stability, and avoid decompensation and the need for higher level of care.     Return in about 1-2 weeks, or earlier if symptoms worsen or fail to improve.         VISIT DIAGNOSIS:     ICD-10-CM ICD-9-CM   1. Generalized anxiety disorder  F41.1 300.02          [facility] No Show Policy:  We understand unexpected circumstances arise; however, anytime you miss your appointment we are unable to provide you appropriate care.  In addition, each appointment missed could have been used to provide  care for others.  We ask that you call at least 24 hours in advance to cancel or reschedule an appointment.  We would like to take this opportunity to remind you of our policy stating patients who miss THREE or more appointments without cancelling or rescheduling 24 hours in advance of the appointment may be subject to cancellation of any further visits with our clinic and recommendation to seek in-person services/visits.    Please call [phone] to reschedule your appointment. If there are reasons that make it difficult for you to keep the appointments, please call and let us know how we can help.  Please understand that medication prescribing will not continue without seeing your provider.      [facility]'s No Show Policy reviewed with patient at today's visit. Patient verbalized understanding of this policy. Discussed with patient that in the event that there are three or more no show visits, it will be recommended that they pursue in-person services/visits as noncompliance with telehealth visits indicates that patient is not an appropriate candidate for telemedicine and would likely be more appropriate for in-person services/visits. Patient verbalizes understanding and is agreeable to this.        This document signed by Kaylen Crowder LCSW electronically Kaylen Crowder LCSW  November 30, 2021 08:54 EST      Part of this note may be an electronic transcription/translation of spoken language to printed text using the Dragon Dictation System.

## 2022-01-31 DIAGNOSIS — F41.9 ANXIETY: ICD-10-CM

## 2022-01-31 RX ORDER — GABAPENTIN 100 MG/1
200 CAPSULE ORAL 3 TIMES DAILY
Qty: 540 CAPSULE | Refills: 0 | Status: SHIPPED | OUTPATIENT
Start: 2022-01-31 | End: 2022-02-22 | Stop reason: SDUPTHER

## 2022-02-07 RX ORDER — ESCITALOPRAM OXALATE 10 MG/1
TABLET ORAL
Qty: 30 TABLET | Refills: 5 | Status: SHIPPED | OUTPATIENT
Start: 2022-02-07 | End: 2022-03-08 | Stop reason: SDUPTHER

## 2022-02-21 DIAGNOSIS — F41.9 ANXIETY: ICD-10-CM

## 2022-02-21 NOTE — TELEPHONE ENCOUNTER
Caller: Emma Villafana    Relationship to patient: Self    Best call back number: [phone]    Patient is needing: PATIENT IS CALLING TO STATE THE PRESCRIPTION THAT WAS WRITTEN ON 01/31/22 WAS FOR ONLY 90 PILLS WITH 5 REFILLS PER PHARMACY.    gabapentin (NEURONTIN) 100 MG capsule       SHE STATES THAT SHE USUALLY TAKES 2 PILLS TWICE A DAY AND THE TOTAL WOULD HAVE BEEN 180 WITH REFILLS.    PATIENT IS WANTING TO KNOW IF DR. CRAWFORD CAN SEND IN A NEW PRESCRIPTION FOR 1 MONTH SUPPLY WITH REFILLS.  SHE STATES SHE CAN ONLY GET 1 MONTH SUPPLY AT A TIME PER INSURANCE.  PATIENT STATES SHE WILL BE OUT OF THE MEDICATION TOMORROW MORNING.  [facility]pharm[facility] #[id number] - [place], [place] - [address] AT [facility] - [phone] [facility] [phone]   [phone]    PLEASE ADVISE.

## 2022-02-22 ENCOUNTER — TELEPHONE (OUTPATIENT)
Dept: INTERNAL MEDICINE | Facility: CLINIC | Age: 42
End: 2022-02-22

## 2022-02-22 RX ORDER — GABAPENTIN 100 MG/1
200 CAPSULE ORAL 2 TIMES DAILY
Qty: 120 CAPSULE | Refills: 0 | Status: SHIPPED | OUTPATIENT
Start: 2022-02-22 | End: 2022-02-22 | Stop reason: DRUGHIGH

## 2022-02-22 RX ORDER — GABAPENTIN 300 MG/1
300 CAPSULE ORAL 3 TIMES DAILY
Qty: 180 CAPSULE | Refills: 0 | Status: CANCELLED | OUTPATIENT
Start: 2022-02-22

## 2022-02-22 RX ORDER — GABAPENTIN 300 MG/1
300 CAPSULE ORAL 2 TIMES DAILY
COMMUNITY
End: 2022-03-23 | Stop reason: SDUPTHER

## 2022-02-22 RX ORDER — GABAPENTIN 300 MG/1
300 CAPSULE ORAL 2 TIMES DAILY
Qty: 180 CAPSULE | Refills: 0 | Status: SHIPPED | OUTPATIENT
Start: 2022-02-22 | End: 2022-03-23 | Stop reason: SDUPTHER

## 2022-02-22 NOTE — TELEPHONE ENCOUNTER
Called spoke with pharmacist, stated insurance will not cover refill until 2/24 for Gabapentin 100 mg, pharmacist recommended to increase strength instead of patient having to take 4 tablets daily  Please Advise

## 2022-02-22 NOTE — TELEPHONE ENCOUNTER
LAST OV 11/19/21  NEXT OV 3/23/22  LAST REFILL 1/31/22 (#540) but requesting 30 day supply for corrected script of 2 tabs bid. Instead of tid.

## 2022-02-22 NOTE — TELEPHONE ENCOUNTER
PATIENT JUST SPOKE TO PHARMACY AND THEY HAVE NOT RECEIVED RX. PLEASE RESEND. PATIENT IS NEARLY OUT AND VERY FRUSTRATED. PLEASE CALL WHEN DONE [phone]

## 2022-02-22 NOTE — TELEPHONE ENCOUNTER
Caller: Emma Villafana    Relationship: Self    Best call back number: [phone] (H)    PATIENT IS REQUESTING CHANCE PLEASE CALL HER REGARDING HER PRESCRIPTION:  gabapentin (NEURONTIN) 100 MG capsule    SHE ONLY HAS 2 DOSES LEFT.

## 2022-02-22 NOTE — TELEPHONE ENCOUNTER
LAST OV 11/19/21  NEXT OV none due 02/2022  LAST REFILL 1/31/22 (#540)  Need to clarify with pharmacy what have they dispensed.    LM for patient to return call. Needs to schedule visit to follow up.

## 2022-03-08 RX ORDER — ESCITALOPRAM OXALATE 10 MG/1
10 TABLET ORAL DAILY
Qty: 30 TABLET | Refills: 5 | Status: SHIPPED | OUTPATIENT
Start: 2022-03-08 | End: 2022-03-09 | Stop reason: SDUPTHER

## 2022-03-09 RX ORDER — ESCITALOPRAM OXALATE 10 MG/1
10 TABLET ORAL DAILY
Qty: 90 TABLET | Refills: 1 | Status: SHIPPED | OUTPATIENT
Start: 2022-03-09 | End: 2022-03-23 | Stop reason: SDUPTHER

## 2022-03-23 ENCOUNTER — TELEMEDICINE (OUTPATIENT)
Dept: INTERNAL MEDICINE | Facility: CLINIC | Age: 42
End: 2022-03-23

## 2022-03-23 DIAGNOSIS — F41.9 ANXIETY: ICD-10-CM

## 2022-03-23 DIAGNOSIS — I10 ESSENTIAL HYPERTENSION: Chronic | ICD-10-CM

## 2022-03-23 DIAGNOSIS — R89.9 ABNORMAL LABORATORY TEST: Primary | ICD-10-CM

## 2022-03-23 PROCEDURE — 99214 OFFICE O/P EST MOD 30 MIN: CPT | Performed by: INTERNAL MEDICINE

## 2022-03-23 RX ORDER — ESCITALOPRAM OXALATE 10 MG/1
10 TABLET ORAL DAILY
Qty: 90 TABLET | Refills: 0 | Status: SHIPPED | OUTPATIENT
Start: 2022-03-23

## 2022-03-23 RX ORDER — GABAPENTIN 300 MG/1
300 CAPSULE ORAL 2 TIMES DAILY
Qty: 180 CAPSULE | Refills: 0 | Status: SHIPPED | OUTPATIENT
Start: 2022-03-23 | End: 2022-05-24 | Stop reason: SDUPTHER

## 2022-03-23 RX ORDER — TRIAMTERENE AND HYDROCHLOROTHIAZIDE 75; 50 MG/1; MG/1
1 TABLET ORAL DAILY
Qty: 90 TABLET | Refills: 0 | Status: SHIPPED | OUTPATIENT
Start: 2022-03-23

## 2022-03-23 NOTE — PROGRESS NOTES
Subjective   Emma Villafana is a 41 y.o. female.     Chief Complaint   Patient presents with   • Hypertension   • Anxiety         Anxiety  Presents for follow-up visit. Symptoms include nervous/anxious behavior and panic. Patient reports no chest pain, depressed mood, palpitations or shortness of breath.       Hypertension  This is a chronic problem. The current episode started more than 1 year ago. The problem is unchanged. Associated symptoms include anxiety. Pertinent negatives include no chest pain, palpitations or shortness of breath.        The following portions of the patient's history were reviewed and updated as appropriate: allergies, current medications, past social history and problem list.    Outpatient Medications Marked as Taking for the 3/23/22 encounter (Telemedicine) with Kalpesh Fair MD   Medication Sig Dispense Refill   • Biotin 10 MG capsule Take 10 mg by mouth Daily.     • escitalopram (LEXAPRO) 10 MG tablet Take 1 tablet by mouth Daily. 90 tablet 1   • gabapentin (NEURONTIN) 300 MG capsule Take 1 capsule by mouth 2 (Two) Times a Day. 180 capsule 0   • hydrOXYzine (ATARAX) 25 MG tablet TAKE 1 TABLET BY MOUTH 2 (TWO) TIMES A DAY AS NEEDED FOR ITCHING. 60 tablet 1   • ibuprofen (IBU) 200 MG tablet Take 200 mg by mouth Daily.     • loratadine (ALAVERT) 10 MG tablet Take 10 mg by mouth Daily.     • potassium chloride (MICRO-K) 10 MEQ CR capsule Take 10 mEq by mouth Daily.     • propranolol (INDERAL) 40 MG tablet TAKE 1 TABLET BY MOUTH 3 (THREE) TIMES A DAY AS NEEDED (ANXIETY). 90 tablet 0   • QUEtiapine (SEROquel) 50 MG tablet Take 1 tablet by mouth Every Night. 30 tablet 2   • triamterene-hydrochlorothiazide (MAXZIDE) 75-50 MG per tablet TAKE 1 TABLET BY MOUTH EVERY DAY 90 tablet 3        Review of Systems   Respiratory: Negative for cough, shortness of breath and wheezing.    Cardiovascular: Negative for chest pain, palpitations and leg swelling.   Gastrointestinal: Negative for constipation  and diarrhea.   Musculoskeletal: Positive for back pain. Arthralgias: left ankle.   Psychiatric/Behavioral: Positive for dysphoric mood. The patient is nervous/anxious.        Objective   There were no vitals filed for this visit.   There were no vitals filed for this visit. [unfilled]  There is no height or weight on file to calculate BMI.      Physical Exam   Constitutional: She appears well-developed.   Pulmonary/Chest: Effort normal.   Abdominal: Normal appearance.   Neurological: She is alert.   Psychiatric: Her behavior is normal. Mood, judgment and thought content normal.          Problems Addressed this Visit        Cardiac and Vasculature    Essential hypertension (Chronic)       Mental Health    Anxiety      Other Visit Diagnoses     Abnormal laboratory test    -  Primary    Relevant Orders    Comprehensive Metabolic Panel    Hepatitis B Surf Antibody Quant      Diagnoses       Codes Comments    Abnormal laboratory test    -  Primary ICD-10-CM: R89.9  ICD-9-CM: 796.4     Essential hypertension     ICD-10-CM: I10  ICD-9-CM: 401.9     Anxiety     ICD-10-CM: F41.9  ICD-9-CM: 300.00         Assessment/Plan   Video visit today due to Covid pandemic.  She is in for recheck of hypertension and anxiety.  No significant anxiety attacks or panic attacks since September 2021.  She's been off of tobacco since January 2019.  Doing very well with her tobacco cessation.   History of chronic alcohol abuse.  Currently sober since 10/13/2021.  She still has periodic and intermittent anxiety.  Inderal works very well for presentations and interviews.  On gabapentin 100 mg 3 times daily and lexapro 10 mg daily.  Off of Seroquel 50 mg at bedtime now.  Anxiety is better.  Not seeing a psychiatrist now due to cost.  Possibility of bipolar is not clear yet.  Pneumovax at the same time.  We will also need to consider getting hepatitis A and B immunizations.  We will check a hepatitis B antibody to see if she is had previous  immunizations.  Also needs a CMP to recheck her potassium and liver function tests since the last one on record has some abnormalities.  She has started a new job at the [employer] and her insurance requires her to see doctors in that plan now.  She will not see me again until next year at the earliest and then only if she changes her insurance back to a plan but does not require [employer] doctors.  She remains on Maxide once daily for blood pressure and propranolol 40 mg as needed for anxiety along with her gabapentin 600 mg at bedtime and Lexapro 10 mg daily for anxiety.  Those are reviewed today.  [employer] platform used.    The above information was reviewed again today 03/23/22.  It continues to be accurate as reflected above and is unchanged.  History, physical and review of systems all reviewed and are unchanged.  Medications were reviewed today and continue the current dosing.           [employer] disclaimer:   Much of this encounter note is an electronic transcription/translation of spoken language to printed text. The electronic translation of spoken language may permit erroneous, or at times, nonsensical words or phrases to be inadvertently transcribed; Although I have reviewed the note for such errors, some may still exist.

## 2022-05-23 DIAGNOSIS — F41.9 ANXIETY: ICD-10-CM

## 2022-05-24 RX ORDER — GABAPENTIN 300 MG/1
CAPSULE ORAL
Qty: 60 CAPSULE | Refills: 2 | OUTPATIENT
Start: 2022-05-24

## 2022-05-24 RX ORDER — GABAPENTIN 300 MG/1
300 CAPSULE ORAL 2 TIMES DAILY
Qty: 180 CAPSULE | Refills: 0 | Status: SHIPPED | OUTPATIENT
Start: 2022-05-24

## 2022-05-24 NOTE — TELEPHONE ENCOUNTER
"LAST OV 3/23/22  NEXT OV - None  LAST REFILL 3/23/22    Per patient: \" I am in the process of transferring care to Oriana Anderson with [employer],  however, she cannot prescribe controls on the first visit.  I will not be able to see her due to my work schedule for a while and will run out of medicine before I can see her again.   \"  "

## 2022-08-10 RX ORDER — ESCITALOPRAM OXALATE 10 MG/1
TABLET ORAL
Qty: 90 TABLET | Refills: 0 | OUTPATIENT
Start: 2022-08-10

## 2022-08-10 NOTE — TELEPHONE ENCOUNTER
Left VM to return call to set up appt. I notice too late that she no longer has Dr Fair as PCP, maybe she has switched. If so, she should submit this request to new PCP.

## 2022-08-12 RX ORDER — ESCITALOPRAM OXALATE 10 MG/1
TABLET ORAL
Qty: 90 TABLET | Refills: 0 | OUTPATIENT
Start: 2022-08-12

## 2022-09-23 RX ORDER — TRIAMTERENE AND HYDROCHLOROTHIAZIDE 75; 50 MG/1; MG/1
TABLET ORAL
Qty: 90 TABLET | Refills: 0 | OUTPATIENT
Start: 2022-09-23